# Patient Record
Sex: FEMALE | Race: WHITE | NOT HISPANIC OR LATINO | Employment: OTHER | URBAN - METROPOLITAN AREA
[De-identification: names, ages, dates, MRNs, and addresses within clinical notes are randomized per-mention and may not be internally consistent; named-entity substitution may affect disease eponyms.]

---

## 2017-07-03 ENCOUNTER — ALLSCRIPTS OFFICE VISIT (OUTPATIENT)
Dept: OTHER | Facility: OTHER | Age: 72
End: 2017-07-03

## 2017-08-14 ENCOUNTER — ALLSCRIPTS OFFICE VISIT (OUTPATIENT)
Dept: OTHER | Facility: OTHER | Age: 72
End: 2017-08-14

## 2017-09-26 ENCOUNTER — APPOINTMENT (OUTPATIENT)
Dept: RADIOLOGY | Facility: CLINIC | Age: 72
End: 2017-09-26
Payer: MEDICARE

## 2017-09-26 ENCOUNTER — ALLSCRIPTS OFFICE VISIT (OUTPATIENT)
Dept: OTHER | Facility: OTHER | Age: 72
End: 2017-09-26

## 2017-09-26 DIAGNOSIS — M25.562 PAIN IN LEFT KNEE: ICD-10-CM

## 2017-09-26 DIAGNOSIS — M17.12 PRIMARY OSTEOARTHRITIS OF LEFT KNEE: ICD-10-CM

## 2017-09-26 PROCEDURE — 73562 X-RAY EXAM OF KNEE 3: CPT

## 2017-10-05 ENCOUNTER — APPOINTMENT (OUTPATIENT)
Dept: PHYSICAL THERAPY | Facility: CLINIC | Age: 72
End: 2017-10-05
Payer: MEDICARE

## 2017-10-05 PROCEDURE — G8991 OTHER PT/OT GOAL STATUS: HCPCS

## 2017-10-05 PROCEDURE — 97110 THERAPEUTIC EXERCISES: CPT

## 2017-10-05 PROCEDURE — G8990 OTHER PT/OT CURRENT STATUS: HCPCS

## 2017-10-05 PROCEDURE — 97162 PT EVAL MOD COMPLEX 30 MIN: CPT

## 2017-10-06 ENCOUNTER — GENERIC CONVERSION - ENCOUNTER (OUTPATIENT)
Dept: OTHER | Facility: OTHER | Age: 72
End: 2017-10-06

## 2017-10-10 ENCOUNTER — APPOINTMENT (OUTPATIENT)
Dept: PHYSICAL THERAPY | Facility: CLINIC | Age: 72
End: 2017-10-10
Payer: MEDICARE

## 2017-10-10 PROCEDURE — 97140 MANUAL THERAPY 1/> REGIONS: CPT

## 2017-10-10 PROCEDURE — 97110 THERAPEUTIC EXERCISES: CPT

## 2017-10-12 ENCOUNTER — APPOINTMENT (OUTPATIENT)
Dept: PHYSICAL THERAPY | Facility: CLINIC | Age: 72
End: 2017-10-12
Payer: MEDICARE

## 2017-10-12 PROCEDURE — 97110 THERAPEUTIC EXERCISES: CPT

## 2017-10-17 ENCOUNTER — APPOINTMENT (OUTPATIENT)
Dept: PHYSICAL THERAPY | Facility: CLINIC | Age: 72
End: 2017-10-17
Payer: MEDICARE

## 2017-10-17 PROCEDURE — 97150 GROUP THERAPEUTIC PROCEDURES: CPT

## 2017-10-17 PROCEDURE — 97110 THERAPEUTIC EXERCISES: CPT

## 2017-10-19 ENCOUNTER — APPOINTMENT (OUTPATIENT)
Dept: PHYSICAL THERAPY | Facility: CLINIC | Age: 72
End: 2017-10-19
Payer: MEDICARE

## 2017-10-20 ENCOUNTER — APPOINTMENT (OUTPATIENT)
Dept: PHYSICAL THERAPY | Facility: CLINIC | Age: 72
End: 2017-10-20
Payer: MEDICARE

## 2017-10-20 PROCEDURE — 97140 MANUAL THERAPY 1/> REGIONS: CPT

## 2017-10-20 PROCEDURE — 97110 THERAPEUTIC EXERCISES: CPT

## 2017-10-24 ENCOUNTER — APPOINTMENT (OUTPATIENT)
Dept: PHYSICAL THERAPY | Facility: CLINIC | Age: 72
End: 2017-10-24
Payer: MEDICARE

## 2017-10-24 PROCEDURE — 97110 THERAPEUTIC EXERCISES: CPT

## 2017-10-24 PROCEDURE — 97140 MANUAL THERAPY 1/> REGIONS: CPT

## 2017-10-26 ENCOUNTER — APPOINTMENT (OUTPATIENT)
Dept: PHYSICAL THERAPY | Facility: CLINIC | Age: 72
End: 2017-10-26
Payer: MEDICARE

## 2017-10-26 PROCEDURE — 97140 MANUAL THERAPY 1/> REGIONS: CPT

## 2017-10-26 PROCEDURE — 97150 GROUP THERAPEUTIC PROCEDURES: CPT

## 2017-10-26 PROCEDURE — 97110 THERAPEUTIC EXERCISES: CPT

## 2018-01-09 ENCOUNTER — GENERIC CONVERSION - ENCOUNTER (OUTPATIENT)
Dept: INTERNAL MEDICINE CLINIC | Facility: CLINIC | Age: 73
End: 2018-01-09

## 2018-01-13 VITALS
DIASTOLIC BLOOD PRESSURE: 73 MMHG | WEIGHT: 185.13 LBS | SYSTOLIC BLOOD PRESSURE: 132 MMHG | BODY MASS INDEX: 36.34 KG/M2 | HEART RATE: 70 BPM | HEIGHT: 60 IN

## 2018-01-15 VITALS
WEIGHT: 190.25 LBS | HEIGHT: 60 IN | DIASTOLIC BLOOD PRESSURE: 79 MMHG | HEART RATE: 71 BPM | BODY MASS INDEX: 37.35 KG/M2 | SYSTOLIC BLOOD PRESSURE: 129 MMHG

## 2018-01-15 VITALS
WEIGHT: 189.5 LBS | HEIGHT: 60 IN | DIASTOLIC BLOOD PRESSURE: 81 MMHG | BODY MASS INDEX: 37.21 KG/M2 | SYSTOLIC BLOOD PRESSURE: 137 MMHG | HEART RATE: 72 BPM

## 2019-07-09 ENCOUNTER — EVALUATION (OUTPATIENT)
Dept: PHYSICAL THERAPY | Facility: CLINIC | Age: 74
End: 2019-07-09
Payer: MEDICARE

## 2019-07-09 DIAGNOSIS — S93.402D SPRAIN OF LIGAMENT OF LEFT ANKLE, SUBSEQUENT ENCOUNTER: Primary | ICD-10-CM

## 2019-07-09 PROCEDURE — G8979 MOBILITY GOAL STATUS: HCPCS | Performed by: PHYSICAL THERAPIST

## 2019-07-09 PROCEDURE — 97161 PT EVAL LOW COMPLEX 20 MIN: CPT | Performed by: PHYSICAL THERAPIST

## 2019-07-09 PROCEDURE — 97110 THERAPEUTIC EXERCISES: CPT | Performed by: PHYSICAL THERAPIST

## 2019-07-09 PROCEDURE — G8978 MOBILITY CURRENT STATUS: HCPCS | Performed by: PHYSICAL THERAPIST

## 2019-07-09 RX ORDER — MELOXICAM 7.5 MG/1
7.5 TABLET ORAL AS NEEDED
COMMUNITY
End: 2021-05-06 | Stop reason: HOSPADM

## 2019-07-09 NOTE — PROGRESS NOTES
PT Evaluation     Today's date: 2019  Patient name: Kandace Jesus  :   MRN: [id number]  Referring provider: Haris Sr MD  Dx:   Encounter Diagnosis     ICD-10-CM    1  Sprain of ligament of left ankle, subsequent encounter S93 402D                   Assessment  Assessment details: Kandace Jesus is a 68 y o  Female who presents with signs and symptoms of referring diagnosis of left ankle sprain  Patient has decreased ankle ROM, deacreased LE strength, decrased flexibility, abnormal gait, and decreased function  Patient would benefit from skilled physical therapy in order to address said deficits and improve functional mobility  Impairments: abnormal gait, abnormal or restricted ROM, abnormal movement, activity intolerance, impaired balance, impaired physical strength, lacks appropriate home exercise program and pain with function  Understanding of Dx/Px/POC: good   Prognosis: good    Goals  STG;  LTG:     Plan  Patient would benefit from: skilled physical therapy  Planned modality interventions: cryotherapy  Planned therapy interventions: joint mobilization, manual therapy, neuromuscular re-education, patient education, strengthening, stretching, therapeutic activities, therapeutic exercise, flexibility, gait training and functional ROM exercises  Frequency: 2x week  Duration in weeks: 6  Treatment plan discussed with: patient        Subjective Evaluation    History of Present Illness  Date of onset: 2019  Mechanism of injury: Patient reports slipping on pool steps and scraped up her knee and twisted her ankle  She had gone to a walk in clinic and had Xrays and had - fractures  She notes instability, swelling, and pain  She reports she has full mobility and function  She uses a SPC on occasion  She is driving, she is doing stairs step-to  Previous c/s surgeries in ' with difficulty feeling sensations and hot/cold in LEs    Pain  Current pain ratin  At worst pain rating: 6      Diagnostic Tests  X-ray: normal  Patient Goals  Patient goal:  get rid of brace, and wear her regular shoes         Objective     Tenderness   Left Ankle/Foot   Tenderness in the anterior talofibular ligament, calcaneofibular ligament and posterior talofibular ligament       Active Range of Motion   Left Ankle/Foot   Dorsiflexion (kf): 0 degrees   Plantar flexion: 50 degrees   Inversion: 18 degrees   Eversion: 22 degrees     Right Ankle/Foot   Dorsiflexion (kf): -4 degrees   Plantar flexion: 44 degrees   Inversion: 26 degrees   Eversion: 30 degrees     Strength/Myotome Testing     Left Ankle/Foot   Dorsiflexion: 4+  Plantar flexion: 4-  Inversion: 4+  Eversion: 4+      Flowsheet Rows      Most Recent Value   PT/OT G-Codes   Current Score  49   Projected Score  68   Assessment Type  Evaluation   G code set  Mobility: Walking & Moving Around   Mobility: Walking and Moving Around Current Status ()  CK   Mobility: Walking and Moving Around Goal Status ()  CJ             Precautions: None     Manual                                                                                   Exercise Diary  7/9            Calf stretch towel 20" x4 SB           Ankle alphabet x1            Ankle pumps x20            Ankle Tb 4 way NV            Seated heel raises  NV            BAPS cw/ccw, front/back, med/latera NV            SLS NV            Towel curls  NV            Wobble board fwd/back, side/side NV                                                                                                                                                               Modalities              CP PRN

## 2019-07-09 NOTE — LETTER
2019    MD Nicole Pineda 5  [address]    Patient: Miranda Fuentes   YOB: 1945   Date of Visit: 2019     Encounter Diagnosis     ICD-10-CM    1  Sprain of ligament of left ankle, subsequent encounter S93 402D        Dear Dr Oconnor Speaks:    Please review the attached Plan of Care from Gaviota Easton Velez's recent visit  Please verify that you agree therapy should continue by signing the attached document and sending it back to our office  If you have any questions or concerns, please don't hesitate to call  Sincerely,    Ashley Ibrahim, PT      Referring Provider:      I certify that I have read the below Plan of Care and certify the need for these services furnished under this plan of treatment while under my care  MD Nicole Pineda 5  [address] [place] [address]: [phone]          PT Evaluation     Today's date: 2019  Patient name: Miranda Fuentes  : 1945  MRN: [id number]  Referring provider: Gary Mora MD  Dx:   Encounter Diagnosis     ICD-10-CM    1  Sprain of ligament of left ankle, subsequent encounter S93 402D                   Assessment  Assessment details: Miranda Fuentes is a 68 y o  Female who presents with signs and symptoms of referring diagnosis of left ankle sprain  Patient has decreased ankle ROM, deacreased LE strength, decrased flexibility, abnormal gait, and decreased function  Patient would benefit from skilled physical therapy in order to address said deficits and improve functional mobility     Impairments: abnormal gait, abnormal or restricted ROM, abnormal movement, activity intolerance, impaired balance, impaired physical strength, lacks appropriate home exercise program and pain with function  Understanding of Dx/Px/POC: good   Prognosis: good    Goals  STG;  LTG:     Plan  Patient would benefit from: skilled physical therapy  Planned modality interventions: cryotherapy  Planned therapy interventions: joint mobilization, manual therapy, neuromuscular re-education, patient education, strengthening, stretching, therapeutic activities, therapeutic exercise, flexibility, gait training and functional ROM exercises  Frequency: 2x week  Duration in weeks: 6  Treatment plan discussed with: patient        Subjective Evaluation    History of Present Illness  Date of onset: 2019  Mechanism of injury: Patient reports slipping on pool steps and scraped up her knee and twisted her ankle  She had gone to a walk in clinic and had Xrays and had - fractures  She notes instability, swelling, and pain  She reports she has full mobility and function  She uses a SPC on occasion  She is driving, she is doing stairs step-to  Previous c/s surgeries in 80' with difficulty feeling sensations and hot/cold in LEs  Pain  Current pain ratin  At worst pain ratin      Diagnostic Tests  X-ray: normal  Patient Goals  Patient goal:  get rid of brace, and wear her regular shoes         Objective     Tenderness   Left Ankle/Foot   Tenderness in the anterior talofibular ligament, calcaneofibular ligament and posterior talofibular ligament       Active Range of Motion   Left Ankle/Foot   Dorsiflexion (kf): 0 degrees   Plantar flexion: 50 degrees   Inversion: 18 degrees   Eversion: 22 degrees     Right Ankle/Foot   Dorsiflexion (kf): -4 degrees   Plantar flexion: 44 degrees   Inversion: 26 degrees   Eversion: 30 degrees     Strength/Myotome Testing     Left Ankle/Foot   Dorsiflexion: 4+  Plantar flexion: 4-  Inversion: 4+  Eversion: 4+      Flowsheet Rows      Most Recent Value   PT/OT G-Codes   Current Score  49   Projected Score  68   Assessment Type  Evaluation   G code set  Mobility: Walking & Moving Around   Mobility: Walking and Moving Around Current Status ()  CK   Mobility: Walking and Moving Around Goal Status ()  CJ             Precautions: None     Manual Exercise Diary  7/9            Calf stretch towel 20" x4 SB           Ankle alphabet x1            Ankle pumps x20            Ankle Tb 4 way NV            Seated heel raises  NV            BAPS cw/ccw, front/back, med/latera NV            SLS NV            Towel curls  NV            Wobble board fwd/back, side/side NV                                                                                                                                                               Modalities              CP PRN

## 2019-07-11 ENCOUNTER — TRANSCRIBE ORDERS (OUTPATIENT)
Dept: PHYSICAL THERAPY | Facility: CLINIC | Age: 74
End: 2019-07-11

## 2019-07-11 DIAGNOSIS — S93.402D SPRAIN OF LIGAMENT OF LEFT ANKLE, SUBSEQUENT ENCOUNTER: Primary | ICD-10-CM

## 2019-07-12 ENCOUNTER — OFFICE VISIT (OUTPATIENT)
Dept: PHYSICAL THERAPY | Facility: CLINIC | Age: 74
End: 2019-07-12
Payer: MEDICARE

## 2019-07-12 DIAGNOSIS — S93.402D SPRAIN OF LIGAMENT OF LEFT ANKLE, SUBSEQUENT ENCOUNTER: Primary | ICD-10-CM

## 2019-07-12 PROCEDURE — 97140 MANUAL THERAPY 1/> REGIONS: CPT | Performed by: PHYSICAL THERAPIST

## 2019-07-12 PROCEDURE — 97110 THERAPEUTIC EXERCISES: CPT | Performed by: PHYSICAL THERAPIST

## 2019-07-12 PROCEDURE — 97530 THERAPEUTIC ACTIVITIES: CPT | Performed by: PHYSICAL THERAPIST

## 2019-07-12 NOTE — PROGRESS NOTES
Daily Note     Today's date: 2019  Patient name: Lexie Garcia  :   MRN: [id number]  Referring provider: Dyllan Mcdonald MD  Dx:   Encounter Diagnosis     ICD-10-CM    1  Sprain of ligament of left ankle, subsequent encounter S93 402D                   Subjective: Patient reports she feels pretty good  She has not bee wearing brace at night or around the house  She has minimal pain  Objective: See treatment diary below      Assessment: Tolerated treatment well  Patient had slight discomfort with stair training, especially with descending stair though form improved as patient progressed  Patient had no pain and difficulty with added TB exercises  Significant calf tightness and soreness noted with stretch at SB today  Patient has difficulty with static balance on ground today and demonstrates  good carryover, mild cues needed for maintaining form and not rolling entire LE  Progress as tolerated  Plan: Progress treatment as tolerated         Precautions: None     Manual              Ankle  DF PROM 8'                                                                    Exercise Diary             Calf stretch towel 20" x4 SB  15" x4           Ankle alphabet x1 HEP           Ankle pumps x20 HEP            Ankle Tb 4 way NV G TB 2x10 each            Seated heel/toe raises  NV 20x           BAPS cw/ccw, front/back, med/latera NV 20x each           SLS NV On ground 10" x3           Towel curls  NV 1'            Wobble board fwd/back, side/side NV NV           Step up and overs  4" x10   6" x10           Hurdles forward   NV           Hurdles lateral  NV                                                                                                                       Modalities              CP PRN

## 2019-07-16 ENCOUNTER — OFFICE VISIT (OUTPATIENT)
Dept: PHYSICAL THERAPY | Facility: CLINIC | Age: 74
End: 2019-07-16
Payer: MEDICARE

## 2019-07-16 DIAGNOSIS — S93.402D SPRAIN OF LIGAMENT OF LEFT ANKLE, SUBSEQUENT ENCOUNTER: Primary | ICD-10-CM

## 2019-07-16 PROCEDURE — 97110 THERAPEUTIC EXERCISES: CPT

## 2019-07-16 PROCEDURE — 97530 THERAPEUTIC ACTIVITIES: CPT

## 2019-07-16 PROCEDURE — 97140 MANUAL THERAPY 1/> REGIONS: CPT

## 2019-07-16 NOTE — PROGRESS NOTES
Daily Note     Today's date: 2019  Patient name: Laura Hawkins  :   MRN: [id number]  Referring provider: Corrinne Parson, MD  Dx:   Encounter Diagnosis     ICD-10-CM    1  Sprain of ligament of left ankle, subsequent encounter S93 402D                   Subjective:  Pt states her ankle has been feeling pretty good  Pt states she feels her ankle is getting close to being fully healed  Objective: See treatment diary below      Assessment: Pt progressed through exercises well with no increase in pain  Pt tolerated new exercises well with good form after verbal cuing  Pt demonstrates good passive ROM of her left ankle with minimal pain at end range  Pt would continue to benefit from PT  Plan: Progress treatment as tolerated         Precautions: None     Manual             Ankle  DF PROM 8' 8'                                                                   Exercise Diary            Calf stretch towel 20" x4 SB  15" x4 SB 15" x 4           Ankle alphabet x1 HEP --          Ankle pumps x20 HEP  --          Ankle Tb 4 way NV G TB 2x10 each  GTB 2 x 10           Seated heel/toe raises  NV 20x 20x           BAPS cw/ccw, front/back, med/latera NV 20x each 20x ea          SLS NV On ground 10" x3 10' x 5           Towel curls  NV 1'  1'          Wobble board fwd/back, side/side NV NV 20x ea          Step up and overs  4" x10   6" x10 6" x 15           Hurdles forward   NV 2 laps           Hurdles lateral  NV 2 laps                                                                                                                      Modalities              CP PRN

## 2019-07-19 ENCOUNTER — OFFICE VISIT (OUTPATIENT)
Dept: PHYSICAL THERAPY | Facility: CLINIC | Age: 74
End: 2019-07-19
Payer: MEDICARE

## 2019-07-19 DIAGNOSIS — S93.402D SPRAIN OF LIGAMENT OF LEFT ANKLE, SUBSEQUENT ENCOUNTER: Primary | ICD-10-CM

## 2019-07-19 PROCEDURE — 97110 THERAPEUTIC EXERCISES: CPT

## 2019-07-19 PROCEDURE — 97140 MANUAL THERAPY 1/> REGIONS: CPT

## 2019-07-19 PROCEDURE — 97530 THERAPEUTIC ACTIVITIES: CPT

## 2019-07-23 ENCOUNTER — APPOINTMENT (OUTPATIENT)
Dept: PHYSICAL THERAPY | Facility: CLINIC | Age: 74
End: 2019-07-23
Payer: MEDICARE

## 2019-07-26 ENCOUNTER — APPOINTMENT (OUTPATIENT)
Dept: PHYSICAL THERAPY | Facility: CLINIC | Age: 74
End: 2019-07-26
Payer: MEDICARE

## 2019-07-30 ENCOUNTER — APPOINTMENT (OUTPATIENT)
Dept: PHYSICAL THERAPY | Facility: CLINIC | Age: 74
End: 2019-07-30
Payer: MEDICARE

## 2020-11-20 RX ORDER — LEVOTHYROXINE SODIUM 125 UG/1
CAPSULE ORAL EVERY MORNING
COMMUNITY
Start: 2020-08-18

## 2020-11-20 RX ORDER — FLUTICASONE FUROATE AND VILANTEROL TRIFENATATE 100; 25 UG/1; UG/1
POWDER RESPIRATORY (INHALATION) EVERY MORNING
COMMUNITY
Start: 2020-09-04

## 2020-11-20 RX ORDER — NAPROXEN SODIUM 220 MG
220 TABLET ORAL EVERY 12 HOURS PRN
COMMUNITY
End: 2021-05-06 | Stop reason: HOSPADM

## 2020-11-20 RX ORDER — LOSARTAN POTASSIUM 25 MG/1
TABLET ORAL
COMMUNITY
Start: 2020-08-18 | End: 2021-05-06 | Stop reason: HOSPADM

## 2020-11-20 RX ORDER — PREDNISOLONE ACETATE 10 MG/ML
SUSPENSION/ DROPS OPHTHALMIC 4 TIMES DAILY
COMMUNITY
Start: 2020-11-18

## 2020-11-20 RX ORDER — PRAVASTATIN SODIUM 20 MG
TABLET ORAL
Status: ON HOLD | COMMUNITY
Start: 2020-08-18 | End: 2021-05-05

## 2020-11-20 RX ORDER — GATIFLOXACIN 5 MG/ML
SOLUTION/ DROPS OPHTHALMIC
Status: ON HOLD | COMMUNITY
Start: 2020-11-18 | End: 2021-01-18 | Stop reason: SDUPTHER

## 2020-11-20 RX ORDER — CHOLECALCIFEROL (VITAMIN D3) 50 MCG
TABLET ORAL 2 TIMES DAILY
COMMUNITY

## 2020-11-20 RX ORDER — CETIRIZINE HYDROCHLORIDE 10 MG/1
10 TABLET ORAL EVERY MORNING
COMMUNITY

## 2020-11-20 RX ORDER — KETOROLAC TROMETHAMINE 5 MG/ML
SOLUTION OPHTHALMIC
Status: ON HOLD | COMMUNITY
Start: 2020-11-18 | End: 2021-01-18 | Stop reason: SDUPTHER

## 2020-11-20 RX ORDER — IBUPROFEN 200 MG
CAPSULE ORAL
COMMUNITY

## 2020-11-24 DIAGNOSIS — Z20.822 COVID-19 RULED OUT BY LABORATORY TESTING: ICD-10-CM

## 2020-11-24 PROCEDURE — U0003 INFECTIOUS AGENT DETECTION BY NUCLEIC ACID (DNA OR RNA); SEVERE ACUTE RESPIRATORY SYNDROME CORONAVIRUS 2 (SARS-COV-2) (CORONAVIRUS DISEASE [COVID-19]), AMPLIFIED PROBE TECHNIQUE, MAKING USE OF HIGH THROUGHPUT TECHNOLOGIES AS DESCRIBED BY CMS-2020-01-R: HCPCS | Performed by: OPHTHALMOLOGY

## 2020-11-25 LAB — SARS-COV-2 RNA SPEC QL NAA+PROBE: NOT DETECTED

## 2020-11-30 ENCOUNTER — HOSPITAL ENCOUNTER (OUTPATIENT)
Facility: AMBULARY SURGERY CENTER | Age: 75
Setting detail: OUTPATIENT SURGERY
Discharge: HOME | End: 2020-11-30
Attending: OPHTHALMOLOGY | Admitting: OPHTHALMOLOGY
Payer: MEDICARE

## 2020-11-30 ENCOUNTER — ANESTHESIA (OUTPATIENT)
Dept: PERIOP | Facility: AMBULARY SURGERY CENTER | Age: 75
End: 2020-11-30
Payer: MEDICARE

## 2020-11-30 ENCOUNTER — ANESTHESIA EVENT (OUTPATIENT)
Dept: PERIOP | Facility: AMBULARY SURGERY CENTER | Age: 75
End: 2020-11-30
Payer: MEDICARE

## 2020-11-30 VITALS
HEART RATE: 73 BPM | BODY MASS INDEX: 36.32 KG/M2 | OXYGEN SATURATION: 93 % | RESPIRATION RATE: 16 BRPM | HEIGHT: 60 IN | SYSTOLIC BLOOD PRESSURE: 132 MMHG | TEMPERATURE: 98 F | DIASTOLIC BLOOD PRESSURE: 68 MMHG | WEIGHT: 185 LBS

## 2020-11-30 VITALS — HEART RATE: 73 BPM

## 2020-11-30 DIAGNOSIS — Z20.822 COVID-19 RULED OUT BY LABORATORY TESTING: Primary | ICD-10-CM

## 2020-11-30 PROBLEM — G47.30 SLEEP APNEA: Status: ACTIVE | Noted: 2020-11-30

## 2020-11-30 PROBLEM — E03.9 HYPOTHYROIDISM: Status: ACTIVE | Noted: 2020-11-30

## 2020-11-30 PROBLEM — I10 HTN (HYPERTENSION): Status: ACTIVE | Noted: 2020-11-30

## 2020-11-30 PROCEDURE — V2632 POST CHMBR INTRAOCULAR LENS: HCPCS | Performed by: OPHTHALMOLOGY

## 2020-11-30 DEVICE — ACRYSOF(R) IQ ASPHERIC NATURAL IOL, SINGLE-PIECE ACRYLIC FOLDABLE PCL, UV WITH BLUE LIGHTFILTER, 13.0MM LENGTH, 6.0MM ANTERIORASYMMETRIC BICONVEX OPTIC, PLANAR HAPTICS.
Type: IMPLANTABLE DEVICE | Site: EYE | Status: FUNCTIONAL
Brand: ACRYSOF®

## 2020-11-30 RX ORDER — BALANCED SALT SOLUTION 6.4; .75; .48; .3; 3.9; 1.7 MG/ML; MG/ML; MG/ML; MG/ML; MG/ML; MG/ML
SOLUTION OPHTHALMIC AS NEEDED
Status: DISCONTINUED | OUTPATIENT
Start: 2020-11-30 | End: 2020-11-30 | Stop reason: HOSPADM

## 2020-11-30 RX ORDER — LIDOCAINE HYDROCHLORIDE 20 MG/ML
1 JELLY TOPICAL
Status: COMPLETED | OUTPATIENT
Start: 2020-11-30 | End: 2020-11-30

## 2020-11-30 RX ORDER — KETOROLAC TROMETHAMINE 5 MG/ML
1 SOLUTION OPHTHALMIC
Status: ACTIVE | OUTPATIENT
Start: 2020-11-30 | End: 2020-11-30

## 2020-11-30 RX ORDER — GATIFLOXACIN 5 MG/ML
SOLUTION/ DROPS OPHTHALMIC AS NEEDED
Status: DISCONTINUED | OUTPATIENT
Start: 2020-11-30 | End: 2020-11-30 | Stop reason: HOSPADM

## 2020-11-30 RX ORDER — PHENYLEPHRINE HCL 2.5 %
1 DROPS OPHTHALMIC (EYE)
Status: ACTIVE | OUTPATIENT
Start: 2020-11-30 | End: 2020-11-30

## 2020-11-30 RX ORDER — TETRACAINE HYDROCHLORIDE 5 MG/ML
1 SOLUTION OPHTHALMIC ONCE
Status: COMPLETED | OUTPATIENT
Start: 2020-11-30 | End: 2020-11-30

## 2020-11-30 RX ORDER — TETRACAINE HYDROCHLORIDE 5 MG/ML
SOLUTION OPHTHALMIC AS NEEDED
Status: DISCONTINUED | OUTPATIENT
Start: 2020-11-30 | End: 2020-11-30 | Stop reason: HOSPADM

## 2020-11-30 RX ORDER — CYCLOPENTOLATE HYDROCHLORIDE 10 MG/ML
1 SOLUTION/ DROPS OPHTHALMIC
Status: ACTIVE | OUTPATIENT
Start: 2020-11-30 | End: 2020-11-30

## 2020-11-30 RX ORDER — MIDAZOLAM HYDROCHLORIDE 2 MG/2ML
INJECTION, SOLUTION INTRAMUSCULAR; INTRAVENOUS AS NEEDED
Status: DISCONTINUED | OUTPATIENT
Start: 2020-11-30 | End: 2020-11-30

## 2020-11-30 RX ADMIN — CYCLOPENTOLATE HYDROCHLORIDE 1 DROP: 10 SOLUTION/ DROPS OPHTHALMIC at 09:45

## 2020-11-30 RX ADMIN — PHENYLEPHRINE HYDROCHLORIDE 1 DROP: 25 SOLUTION/ DROPS OPHTHALMIC at 09:45

## 2020-11-30 RX ADMIN — LIDOCAINE HYDROCHLORIDE 1 APPLICATION: 20 JELLY TOPICAL at 09:30

## 2020-11-30 RX ADMIN — CYCLOPENTOLATE HYDROCHLORIDE 1 DROP: 10 SOLUTION/ DROPS OPHTHALMIC at 09:30

## 2020-11-30 RX ADMIN — TETRACAINE HYDROCHLORIDE 1 DROP: 5 SOLUTION OPHTHALMIC at 09:15

## 2020-11-30 RX ADMIN — LIDOCAINE HYDROCHLORIDE 1 APPLICATION: 20 JELLY TOPICAL at 09:45

## 2020-11-30 RX ADMIN — MIDAZOLAM 2 MG: 1 INJECTION INTRAMUSCULAR; INTRAVENOUS at 10:05

## 2020-11-30 RX ADMIN — PHENYLEPHRINE HYDROCHLORIDE 1 DROP: 25 SOLUTION/ DROPS OPHTHALMIC at 09:30

## 2020-11-30 RX ADMIN — CYCLOPENTOLATE HYDROCHLORIDE 1 DROP: 10 SOLUTION/ DROPS OPHTHALMIC at 09:15

## 2020-11-30 RX ADMIN — PHENYLEPHRINE HYDROCHLORIDE 1 DROP: 25 SOLUTION/ DROPS OPHTHALMIC at 09:15

## 2020-11-30 RX ADMIN — KETOROLAC TROMETHAMINE 1 DROP: 5 SOLUTION OPHTHALMIC at 09:30

## 2020-11-30 RX ADMIN — LIDOCAINE HYDROCHLORIDE 1 APPLICATION: 20 JELLY TOPICAL at 09:15

## 2020-11-30 RX ADMIN — KETOROLAC TROMETHAMINE 1 DROP: 5 SOLUTION OPHTHALMIC at 09:45

## 2020-11-30 RX ADMIN — KETOROLAC TROMETHAMINE 1 DROP: 5 SOLUTION OPHTHALMIC at 09:15

## 2021-01-06 DIAGNOSIS — Z20.822 COVID-19 RULED OUT BY LABORATORY TESTING: Primary | ICD-10-CM

## 2021-01-08 NOTE — PRE-PROCEDURE INSTRUCTIONS
My Surgical Experience    The following information was developed to assist you to prepare for your operation  What do I need to do before coming to the hospital?   Arrange for a responsible person to drive you to and from the hospital    Arrange care for your children at home  Children are not allowed in the recovery areas of the hospital   Plan to wear clothing that is easy to put on and take off  If you are having shoulder surgery, wear a shirt that buttons or zippers in the front  Bathing  o Shower the evening before and the morning of your surgery with an antibacterial soap  Please refer to the Pre Op Showering Instructions for Surgery Patients Sheet   o Remove nail polish and all body piercing jewelry  o Do not shave any body part for at least 24 hours before surgery-this includes face, arms, legs and upper body  Food  o Nothing to eat or drink after midnight the night before your surgery  This includes candy and chewing gum  o Exception: If your surgery is after 12:00pm (noon), you may have clear liquids such as 7-Up®, ginger ale, apple or cranberry juice, Jell-O®, water, or clear broth until 8:00 am  o Do not drink milk or juice with pulp on the morning before surgery  o Do not drink alcohol 24 hours before surgery  Medicine  o Follow instructions you received from your surgeon about which medicines you may take on the day of surgery  o If instructed to take medicine on the morning of surgery, take pills with just a small sip of water  Call your prescribing doctor for specific infroamtion on what to do if you take insulin    What should I bring to the hospital?    Bring:  Orest Pepper or a walker, if you have them, for foot or knee surgery   A list of the daily medicines, vitamins, minerals, herbals and nutritional supplements you take   Include the dosages of medicines and the time you take them each day   Glasses, dentures or hearing aids   Minimal clothing; you will be wearing hospital sleepwear   Photo ID; required to verify your identity   If you have a Living Will or Power of , bring a copy of the documents   If you have an ostomy, bring an extra pouch and any supplies you use    Do not bring   Medicines or inhalers   Money, valuables or jewelry    What other information should I know about the day of surgery?  Notify your surgeons if you develop a cold, sore throat, cough, fever, rash or any other illness   Report to the Ambulatory Surgical/Same Day Surgery Unit   You will be instructed to stop at Registration only if you have not been pre-registered   Inform your  fi they do not stay that they will be asked by the staff to leave a phone number where they can be reached   Be available to be reached before surgery  In the event the operating room schedule changes, you may be asked to come in earlier or later than expected    *It is important to tell your doctor and others involved in your health care if you are taking or have been taking any non-prescription drugs, vitamins, minerals, herbals or other nutritional supplements  Any of these may interact with some food or medicines and cause a reaction      Pre-Surgery Instructions:   Medication Instructions    Calcium 600 MG tablet Instructed patient per Anesthesia Guidelines   cetirizine (ZyrTEC) 10 mg tablet Instructed patient per Anesthesia Guidelines   Cholecalciferol (Vitamin D) 50 MCG (2000 UT) tablet Instructed patient per Anesthesia Guidelines   Denosumab (PROLIA SC) Instructed patient per Anesthesia Guidelines   fluticasone-vilanterol (Breo Ellipta) 100-25 mcg/inh inhaler Instructed patient per Anesthesia Guidelines   Ibuprofen-diphenhydrAMINE Cit (ADVIL PM PO) Instructed patient per Anesthesia Guidelines   levothyroxine 112 mcg tablet Instructed patient per Anesthesia Guidelines   losartan (COZAAR) 25 mg tablet Instructed patient per Anesthesia Guidelines      meloxicam (MOBIC) 7 5 mg tablet Instructed patient per Anesthesia Guidelines   Multiple Vitamins-Minerals (CENTRUM SILVER PO) Instructed patient per Anesthesia Guidelines   naproxen sodium (ALEVE) 220 MG tablet Instructed patient per Anesthesia Guidelines   pravastatin (PRAVACHOL) 20 mg tablet Instructed patient per Anesthesia Guidelines   Probiotic Product (PROBIOTIC DAILY PO) Instructed patient per Anesthesia Guidelines  To take synthroid and use inhaler a m  of surgery

## 2021-01-12 DIAGNOSIS — Z20.822 COVID-19 RULED OUT BY LABORATORY TESTING: ICD-10-CM

## 2021-01-12 PROCEDURE — U0003 INFECTIOUS AGENT DETECTION BY NUCLEIC ACID (DNA OR RNA); SEVERE ACUTE RESPIRATORY SYNDROME CORONAVIRUS 2 (SARS-COV-2) (CORONAVIRUS DISEASE [COVID-19]), AMPLIFIED PROBE TECHNIQUE, MAKING USE OF HIGH THROUGHPUT TECHNOLOGIES AS DESCRIBED BY CMS-2020-01-R: HCPCS | Performed by: OPHTHALMOLOGY

## 2021-01-12 PROCEDURE — U0005 INFEC AGEN DETEC AMPLI PROBE: HCPCS | Performed by: OPHTHALMOLOGY

## 2021-01-13 LAB — SARS-COV-2 RNA SPEC QL NAA+PROBE: NOT DETECTED

## 2021-01-18 ENCOUNTER — HOSPITAL ENCOUNTER (OUTPATIENT)
Facility: AMBULARY SURGERY CENTER | Age: 76
Setting detail: OUTPATIENT SURGERY
Discharge: HOME | End: 2021-01-18
Attending: OPHTHALMOLOGY | Admitting: OPHTHALMOLOGY
Payer: MEDICARE

## 2021-01-18 ENCOUNTER — ANESTHESIA EVENT (OUTPATIENT)
Dept: PERIOP | Facility: AMBULARY SURGERY CENTER | Age: 76
End: 2021-01-18
Payer: MEDICARE

## 2021-01-18 ENCOUNTER — ANESTHESIA (OUTPATIENT)
Dept: PERIOP | Facility: AMBULARY SURGERY CENTER | Age: 76
End: 2021-01-18
Payer: MEDICARE

## 2021-01-18 VITALS
HEIGHT: 60 IN | TEMPERATURE: 96.8 F | DIASTOLIC BLOOD PRESSURE: 66 MMHG | RESPIRATION RATE: 16 BRPM | HEART RATE: 76 BPM | WEIGHT: 185 LBS | OXYGEN SATURATION: 94 % | BODY MASS INDEX: 36.32 KG/M2 | SYSTOLIC BLOOD PRESSURE: 148 MMHG

## 2021-01-18 VITALS — HEART RATE: 72 BPM

## 2021-01-18 DIAGNOSIS — H25.11 AGE-RELATED NUCLEAR CATARACT OF RIGHT EYE: Primary | ICD-10-CM

## 2021-01-18 PROCEDURE — V2632 POST CHMBR INTRAOCULAR LENS: HCPCS | Performed by: OPHTHALMOLOGY

## 2021-01-18 DEVICE — ACRYSOF(R) IQ ASPHERIC NATURAL IOL, SINGLE-PIECE ACRYLIC FOLDABLE PCL, UV WITH BLUE LIGHTFILTER, 13.0MM LENGTH, 6.0MM ANTERIORASYMMETRIC BICONVEX OPTIC, PLANAR HAPTICS.
Type: IMPLANTABLE DEVICE | Site: EYE | Status: FUNCTIONAL
Brand: ACRYSOF®

## 2021-01-18 RX ORDER — TETRACAINE HYDROCHLORIDE 5 MG/ML
SOLUTION OPHTHALMIC AS NEEDED
Status: DISCONTINUED | OUTPATIENT
Start: 2021-01-18 | End: 2021-01-18 | Stop reason: HOSPADM

## 2021-01-18 RX ORDER — PHENYLEPHRINE HCL 2.5 %
1 DROPS OPHTHALMIC (EYE)
Status: COMPLETED | OUTPATIENT
Start: 2021-01-18 | End: 2021-01-18

## 2021-01-18 RX ORDER — GATIFLOXACIN 5 MG/ML
1 SOLUTION/ DROPS OPHTHALMIC 2 TIMES DAILY
Refills: 0 | Status: ON HOLD
Start: 2021-01-18 | End: 2021-05-05

## 2021-01-18 RX ORDER — ONDANSETRON 2 MG/ML
4 INJECTION INTRAMUSCULAR; INTRAVENOUS ONCE AS NEEDED
Status: DISCONTINUED | OUTPATIENT
Start: 2021-01-18 | End: 2021-01-18 | Stop reason: HOSPADM

## 2021-01-18 RX ORDER — BALANCED SALT SOLUTION 6.4; .75; .48; .3; 3.9; 1.7 MG/ML; MG/ML; MG/ML; MG/ML; MG/ML; MG/ML
SOLUTION OPHTHALMIC AS NEEDED
Status: DISCONTINUED | OUTPATIENT
Start: 2021-01-18 | End: 2021-01-18 | Stop reason: HOSPADM

## 2021-01-18 RX ORDER — LIDOCAINE HYDROCHLORIDE 20 MG/ML
1 JELLY TOPICAL
Status: COMPLETED | OUTPATIENT
Start: 2021-01-18 | End: 2021-01-18

## 2021-01-18 RX ORDER — KETOROLAC TROMETHAMINE 5 MG/ML
1 SOLUTION OPHTHALMIC 4 TIMES DAILY
Qty: 5 ML | Refills: 0 | Status: ON HOLD
Start: 2021-01-18 | End: 2021-05-05

## 2021-01-18 RX ORDER — LIDOCAINE HYDROCHLORIDE 10 MG/ML
INJECTION, SOLUTION EPIDURAL; INFILTRATION; INTRACAUDAL; PERINEURAL AS NEEDED
Status: DISCONTINUED | OUTPATIENT
Start: 2021-01-18 | End: 2021-01-18 | Stop reason: HOSPADM

## 2021-01-18 RX ORDER — TETRACAINE HYDROCHLORIDE 5 MG/ML
1 SOLUTION OPHTHALMIC ONCE
Status: COMPLETED | OUTPATIENT
Start: 2021-01-18 | End: 2021-01-18

## 2021-01-18 RX ORDER — MIDAZOLAM HYDROCHLORIDE 2 MG/2ML
INJECTION, SOLUTION INTRAMUSCULAR; INTRAVENOUS AS NEEDED
Status: DISCONTINUED | OUTPATIENT
Start: 2021-01-18 | End: 2021-01-18

## 2021-01-18 RX ORDER — CYCLOPENTOLATE HYDROCHLORIDE 10 MG/ML
1 SOLUTION/ DROPS OPHTHALMIC
Status: COMPLETED | OUTPATIENT
Start: 2021-01-18 | End: 2021-01-18

## 2021-01-18 RX ORDER — GATIFLOXACIN 5 MG/ML
SOLUTION/ DROPS OPHTHALMIC AS NEEDED
Status: DISCONTINUED | OUTPATIENT
Start: 2021-01-18 | End: 2021-01-18 | Stop reason: HOSPADM

## 2021-01-18 RX ORDER — KETOROLAC TROMETHAMINE 5 MG/ML
1 SOLUTION OPHTHALMIC
Status: COMPLETED | OUTPATIENT
Start: 2021-01-18 | End: 2021-01-18

## 2021-01-18 RX ADMIN — TETRACAINE HYDROCHLORIDE 1 DROP: 5 SOLUTION OPHTHALMIC at 06:20

## 2021-01-18 RX ADMIN — KETOROLAC TROMETHAMINE 1 DROP: 5 SOLUTION OPHTHALMIC at 06:20

## 2021-01-18 RX ADMIN — CYCLOPENTOLATE HYDROCHLORIDE 1 DROP: 10 SOLUTION/ DROPS OPHTHALMIC at 07:05

## 2021-01-18 RX ADMIN — PHENYLEPHRINE HYDROCHLORIDE 1 DROP: 25 SOLUTION/ DROPS OPHTHALMIC at 06:50

## 2021-01-18 RX ADMIN — CYCLOPENTOLATE HYDROCHLORIDE 1 DROP: 10 SOLUTION/ DROPS OPHTHALMIC at 06:50

## 2021-01-18 RX ADMIN — KETOROLAC TROMETHAMINE 1 DROP: 5 SOLUTION OPHTHALMIC at 06:50

## 2021-01-18 RX ADMIN — PHENYLEPHRINE HYDROCHLORIDE 1 DROP: 25 SOLUTION/ DROPS OPHTHALMIC at 06:20

## 2021-01-18 RX ADMIN — LIDOCAINE HYDROCHLORIDE 5 APPLICATION: 20 JELLY TOPICAL at 06:35

## 2021-01-18 RX ADMIN — PHENYLEPHRINE HYDROCHLORIDE 1 DROP: 25 SOLUTION/ DROPS OPHTHALMIC at 06:35

## 2021-01-18 RX ADMIN — KETOROLAC TROMETHAMINE 1 DROP: 5 SOLUTION OPHTHALMIC at 06:35

## 2021-01-18 RX ADMIN — LIDOCAINE HYDROCHLORIDE 5 APPLICATION: 20 JELLY TOPICAL at 06:50

## 2021-01-18 RX ADMIN — PHENYLEPHRINE HYDROCHLORIDE 1 DROP: 25 SOLUTION/ DROPS OPHTHALMIC at 07:05

## 2021-01-18 RX ADMIN — CYCLOPENTOLATE HYDROCHLORIDE 1 DROP: 10 SOLUTION/ DROPS OPHTHALMIC at 06:20

## 2021-01-18 RX ADMIN — MIDAZOLAM 2 MG: 1 INJECTION INTRAMUSCULAR; INTRAVENOUS at 07:05

## 2021-01-18 RX ADMIN — LIDOCAINE HYDROCHLORIDE 5 APPLICATION: 20 JELLY TOPICAL at 06:58

## 2021-01-18 RX ADMIN — KETOROLAC TROMETHAMINE 1 DROP: 5 SOLUTION OPHTHALMIC at 07:05

## 2021-01-18 RX ADMIN — CYCLOPENTOLATE HYDROCHLORIDE 1 DROP: 10 SOLUTION/ DROPS OPHTHALMIC at 06:35

## 2021-01-18 NOTE — ANESTHESIA PREPROCEDURE EVALUATION
Procedure:  EXTRACTION EXTRACAPSULAR CATARACT PHACO INTRAOCULAR LENS (IOL) (Right Eye)    Relevant Problems   ANESTHESIA (within normal limits)      CARDIO   (+) HTN (hypertension)      ENDO   (+) Hypothyroidism      PULMONARY   (+) Sleep apnea        Physical Exam    Airway    Mallampati score: II  TM Distance: >3 FB  Neck ROM: full     Dental   Comment: Upper partial denture,     Cardiovascular  Rhythm: regular, Rate: normal,     Pulmonary  Pulmonary exam normal     Other Findings        Anesthesia Plan  ASA Score- 2     Anesthesia Type- IV sedation with anesthesia with ASA Monitors  Additional Monitors:   Airway Plan:           Plan Factors-Exercise tolerance (METS): >4 METS  Chart reviewed  Existing labs reviewed  Patient summary reviewed  Patient is not a current smoker  Induction- intravenous  Postoperative Plan-     Informed Consent- Anesthetic plan and risks discussed with patient  I personally reviewed this patient with the CRNA  Discussed and agreed on the Anesthesia Plan with the CRNA  Batool Lock

## 2021-01-18 NOTE — DISCHARGE INSTRUCTIONS
Dr Cindy Blanton Cataract Instructions    Activity:     1  No Driving until instructed   2  Keep shield on until seen tomorrow except when administering drops   3  No heavy lifting   4  No water in eye     Diet:     1  Resume normal diet    Normal Symptoms:     1  Mild Headache   2  Scratchy or picky feeling around eye    Call the office if:     1  You have any questions or concerns   2  If eye pain is not relieved by extra strength tylenol    Office phone number:  [phone]      Next appointment:     1  See Dr Cindy Blanton at his office tomorrow as scheduled   ___________9:15 am_______________________________________________   2  Bring blue eye kit with you and eyedrops to the office    A new set of comprehensive instructions will be given and reviewed with you during your office visit tomorrow

## 2021-01-18 NOTE — OP NOTE
OPERATIVE REPORT    PATIENT NAME: Yung Norton    :  1945  MRN: [id number]  Pt Location: [facility] OR ROOM 01    Surgery Date: 2021    Surgeon(s) and Role:     * Nate Chun MD - Primary    Age-related nuclear cataract, right eye [H25 11]    Post-Op Diagnosis Codes:     * Age-related nuclear cataract, right eye [H25 11]    Procedure(s):  EXTRACTION EXTRACAPSULAR CATARACT PHACO INTRAOCULAR LENS (IOL)    Anesthesia Type:   IV Sedation with Anesthesia    Operative Indications:  Age-related nuclear cataract, right eye [H25 11]  Decreased vision to 20/40  With problems driving  Pt requested cataract sx the right eye    Procedure and Technique:    Procedure Details     The patient was brought in the OR in stable condition and placed on the operative table  The right eye was prepped and draped in the usual sterile manner  Attention was directed to the right eye where a lid speculum was placed  A 2 4 mm clear corneal incision was made temperally  1/2 cc of 1% MPF Lidocaine was irrigated into the anterior chamber followed by viscoat  The side port incision was placed superiorly  The pupil remained small and I did not feel it was safe to proceed  Therefore, a Beehler dilator was used to dilate the pupil further  The pupil stayed dilated with a small amount of micro hemorrhage at the margin  More viscoat was used and the pupil was dilated nicely and the procedure could be continued safely  The capsularrhexis was made and the nucleus was hydrodissected with BSS  The nucleus was then removed with the phaco handpiece followed by removal of the cortical material with the I/A handpiece  The capsular bag was then filled with Provisc  The IOL was folded and placed in to the capsular bag and centered well  The remaining Provisc was removed from the eye with the I/A  The wounds were hydrated with BSS and found to be water tight  The lid speculum was removed and 2 drops of Gatifloxicin were placed over the cornea  A protective eye shield was taped over the eye and the patient went to PACU in stable condition  I will see the patient in the office tomorrow and the expected post op period is a few weeks         Complications: None        Disposition: PACU   Condition: Stable    SIGNATURE: Ronda Ma MD  DATE: January 18, 2021  TIME: 7:34 AM

## 2021-02-13 DIAGNOSIS — Z23 ENCOUNTER FOR IMMUNIZATION: ICD-10-CM

## 2021-05-05 ENCOUNTER — APPOINTMENT (EMERGENCY)
Dept: RADIOLOGY | Facility: HOSPITAL | Age: 76
End: 2021-05-05
Payer: MEDICARE

## 2021-05-05 ENCOUNTER — HOSPITAL ENCOUNTER (OUTPATIENT)
Facility: HOSPITAL | Age: 76
Setting detail: OBSERVATION
LOS: 1 days | Discharge: HOME | End: 2021-05-06
Attending: EMERGENCY MEDICINE | Admitting: HOSPITALIST
Payer: MEDICARE

## 2021-05-05 ENCOUNTER — APPOINTMENT (OUTPATIENT)
Dept: NON INVASIVE DIAGNOSTICS | Facility: HOSPITAL | Age: 76
End: 2021-05-05
Payer: MEDICARE

## 2021-05-05 DIAGNOSIS — I48.0 PAROXYSMAL ATRIAL FIBRILLATION (HCC): ICD-10-CM

## 2021-05-05 DIAGNOSIS — A41.9 SEPSIS (HCC): ICD-10-CM

## 2021-05-05 DIAGNOSIS — N39.0 UTI (URINARY TRACT INFECTION): Primary | ICD-10-CM

## 2021-05-05 DIAGNOSIS — I47.1 SVT (SUPRAVENTRICULAR TACHYCARDIA) (HCC): ICD-10-CM

## 2021-05-05 DIAGNOSIS — R00.2 PALPITATIONS: ICD-10-CM

## 2021-05-05 PROBLEM — Z87.891 FORMER TOBACCO USE: Status: ACTIVE | Noted: 2021-05-05

## 2021-05-05 PROBLEM — R60.9 PITTING EDEMA: Status: ACTIVE | Noted: 2021-05-05

## 2021-05-05 PROBLEM — J43.8 OTHER EMPHYSEMA (HCC): Status: ACTIVE | Noted: 2021-05-05

## 2021-05-05 PROBLEM — J44.9 COPD (CHRONIC OBSTRUCTIVE PULMONARY DISEASE) (HCC): Status: ACTIVE | Noted: 2021-05-05

## 2021-05-05 PROBLEM — J43.8 OTHER EMPHYSEMA (HCC): Status: RESOLVED | Noted: 2021-05-05 | Resolved: 2021-05-05

## 2021-05-05 PROBLEM — I49.9 ARRHYTHMIA: Status: ACTIVE | Noted: 2021-05-05

## 2021-05-05 LAB
ALBUMIN SERPL BCP-MCNC: 3.4 G/DL (ref 3.5–5)
ALP SERPL-CCNC: 483 U/L (ref 46–116)
ALT SERPL W P-5'-P-CCNC: 168 U/L (ref 12–78)
ANION GAP SERPL CALCULATED.3IONS-SCNC: 5 MMOL/L (ref 4–13)
APTT PPP: 33 SECONDS (ref 23–37)
AST SERPL W P-5'-P-CCNC: 109 U/L (ref 5–45)
ATRIAL RATE: 267 BPM
ATRIAL RATE: 86 BPM
BACTERIA UR QL AUTO: ABNORMAL /HPF
BASOPHILS # BLD AUTO: 0.08 THOUSANDS/ΜL (ref 0–0.1)
BASOPHILS NFR BLD AUTO: 1 % (ref 0–1)
BILIRUB SERPL-MCNC: 0.72 MG/DL (ref 0.2–1)
BILIRUB UR QL STRIP: NEGATIVE
BUN SERPL-MCNC: 17 MG/DL (ref 5–25)
CALCIUM ALBUM COR SERPL-MCNC: 10.4 MG/DL (ref 8.3–10.1)
CALCIUM SERPL-MCNC: 9.9 MG/DL (ref 8.3–10.1)
CHLORIDE SERPL-SCNC: 110 MMOL/L (ref 100–108)
CLARITY UR: ABNORMAL
CO2 SERPL-SCNC: 24 MMOL/L (ref 21–32)
COLOR UR: YELLOW
CREAT SERPL-MCNC: 0.89 MG/DL (ref 0.6–1.3)
D DIMER PPP FEU-MCNC: 0.32 UG/ML FEU
EOSINOPHIL # BLD AUTO: 0.71 THOUSAND/ΜL (ref 0–0.61)
EOSINOPHIL NFR BLD AUTO: 8 % (ref 0–6)
ERYTHROCYTE [DISTWIDTH] IN BLOOD BY AUTOMATED COUNT: 13.6 % (ref 11.6–15.1)
GFR SERPL CREATININE-BSD FRML MDRD: 64 ML/MIN/1.73SQ M
GLUCOSE SERPL-MCNC: 115 MG/DL (ref 65–140)
GLUCOSE UR STRIP-MCNC: NEGATIVE MG/DL
HAV IGM SER QL: NORMAL
HBV CORE IGM SER QL: NORMAL
HBV SURFACE AG SER QL: NORMAL
HCT VFR BLD AUTO: 48.5 % (ref 34.8–46.1)
HCV AB SER QL: NORMAL
HGB BLD-MCNC: 16 G/DL (ref 11.5–15.4)
HGB UR QL STRIP.AUTO: ABNORMAL
HYALINE CASTS #/AREA URNS LPF: ABNORMAL /LPF
IMM GRANULOCYTES # BLD AUTO: 0.03 THOUSAND/UL (ref 0–0.2)
IMM GRANULOCYTES NFR BLD AUTO: 0 % (ref 0–2)
INR PPP: 0.99 (ref 0.84–1.19)
KETONES UR STRIP-MCNC: NEGATIVE MG/DL
LACTATE SERPL-SCNC: 2 MMOL/L (ref 0.5–2)
LEUKOCYTE ESTERASE UR QL STRIP: ABNORMAL
LYMPHOCYTES # BLD AUTO: 1.77 THOUSANDS/ΜL (ref 0.6–4.47)
LYMPHOCYTES NFR BLD AUTO: 19 % (ref 14–44)
MCH RBC QN AUTO: 33.8 PG (ref 26.8–34.3)
MCHC RBC AUTO-ENTMCNC: 33 G/DL (ref 31.4–37.4)
MCV RBC AUTO: 103 FL (ref 82–98)
MONOCYTES # BLD AUTO: 0.6 THOUSAND/ΜL (ref 0.17–1.22)
MONOCYTES NFR BLD AUTO: 7 % (ref 4–12)
NEUTROPHILS # BLD AUTO: 6.01 THOUSANDS/ΜL (ref 1.85–7.62)
NEUTS SEG NFR BLD AUTO: 65 % (ref 43–75)
NITRITE UR QL STRIP: POSITIVE
NON-SQ EPI CELLS URNS QL MICRO: ABNORMAL /HPF
NRBC BLD AUTO-RTO: 0 /100 WBCS
NT-PROBNP SERPL-MCNC: 710 PG/ML
P AXIS: 68 DEGREES
PH UR STRIP.AUTO: 6.5 [PH]
PLATELET # BLD AUTO: 354 THOUSANDS/UL (ref 149–390)
PMV BLD AUTO: 9.5 FL (ref 8.9–12.7)
POTASSIUM SERPL-SCNC: 4.4 MMOL/L (ref 3.5–5.3)
PR INTERVAL: 162 MS
PROT SERPL-MCNC: 8.3 G/DL (ref 6.4–8.2)
PROT UR STRIP-MCNC: NEGATIVE MG/DL
PROTHROMBIN TIME: 13.1 SECONDS (ref 11.6–14.5)
QRS AXIS: 78 DEGREES
QRS AXIS: 92 DEGREES
QRSD INTERVAL: 70 MS
QRSD INTERVAL: 74 MS
QT INTERVAL: 306 MS
QT INTERVAL: 342 MS
QTC INTERVAL: 409 MS
QTC INTERVAL: 470 MS
RBC # BLD AUTO: 4.73 MILLION/UL (ref 3.81–5.12)
RBC #/AREA URNS AUTO: ABNORMAL /HPF
SARS-COV-2 RNA RESP QL NAA+PROBE: NEGATIVE
SODIUM SERPL-SCNC: 139 MMOL/L (ref 136–145)
SP GR UR STRIP.AUTO: 1.01 (ref 1–1.03)
T WAVE AXIS: 31 DEGREES
T WAVE AXIS: 7 DEGREES
TROPONIN I SERPL-MCNC: <0.02 NG/ML
TSH SERPL DL<=0.05 MIU/L-ACNC: 2.58 UIU/ML (ref 0.36–3.74)
UROBILINOGEN UR QL STRIP.AUTO: 1 E.U./DL
VENTRICULAR RATE: 142 BPM
VENTRICULAR RATE: 86 BPM
WBC # BLD AUTO: 9.2 THOUSAND/UL (ref 4.31–10.16)
WBC #/AREA URNS AUTO: ABNORMAL /HPF

## 2021-05-05 PROCEDURE — 87186 SC STD MICRODIL/AGAR DIL: CPT | Performed by: EMERGENCY MEDICINE

## 2021-05-05 PROCEDURE — 84484 ASSAY OF TROPONIN QUANT: CPT | Performed by: EMERGENCY MEDICINE

## 2021-05-05 PROCEDURE — 93005 ELECTROCARDIOGRAM TRACING: CPT

## 2021-05-05 PROCEDURE — 85610 PROTHROMBIN TIME: CPT | Performed by: EMERGENCY MEDICINE

## 2021-05-05 PROCEDURE — 99285 EMERGENCY DEPT VISIT HI MDM: CPT | Performed by: EMERGENCY MEDICINE

## 2021-05-05 PROCEDURE — 80074 ACUTE HEPATITIS PANEL: CPT | Performed by: EMERGENCY MEDICINE

## 2021-05-05 PROCEDURE — 93010 ELECTROCARDIOGRAM REPORT: CPT | Performed by: INTERNAL MEDICINE

## 2021-05-05 PROCEDURE — 71045 X-RAY EXAM CHEST 1 VIEW: CPT

## 2021-05-05 PROCEDURE — 85730 THROMBOPLASTIN TIME PARTIAL: CPT | Performed by: EMERGENCY MEDICINE

## 2021-05-05 PROCEDURE — 96360 HYDRATION IV INFUSION INIT: CPT

## 2021-05-05 PROCEDURE — 96361 HYDRATE IV INFUSION ADD-ON: CPT

## 2021-05-05 PROCEDURE — 99205 OFFICE O/P NEW HI 60 MIN: CPT | Performed by: INTERNAL MEDICINE

## 2021-05-05 PROCEDURE — 85379 FIBRIN DEGRADATION QUANT: CPT | Performed by: EMERGENCY MEDICINE

## 2021-05-05 PROCEDURE — 93306 TTE W/DOPPLER COMPLETE: CPT

## 2021-05-05 PROCEDURE — 1123F ACP DISCUSS/DSCN MKR DOCD: CPT | Performed by: EMERGENCY MEDICINE

## 2021-05-05 PROCEDURE — 87040 BLOOD CULTURE FOR BACTERIA: CPT | Performed by: EMERGENCY MEDICINE

## 2021-05-05 PROCEDURE — 84443 ASSAY THYROID STIM HORMONE: CPT | Performed by: EMERGENCY MEDICINE

## 2021-05-05 PROCEDURE — 74177 CT ABD & PELVIS W/CONTRAST: CPT

## 2021-05-05 PROCEDURE — 87077 CULTURE AEROBIC IDENTIFY: CPT | Performed by: EMERGENCY MEDICINE

## 2021-05-05 PROCEDURE — U0003 INFECTIOUS AGENT DETECTION BY NUCLEIC ACID (DNA OR RNA); SEVERE ACUTE RESPIRATORY SYNDROME CORONAVIRUS 2 (SARS-COV-2) (CORONAVIRUS DISEASE [COVID-19]), AMPLIFIED PROBE TECHNIQUE, MAKING USE OF HIGH THROUGHPUT TECHNOLOGIES AS DESCRIBED BY CMS-2020-01-R: HCPCS | Performed by: HOSPITALIST

## 2021-05-05 PROCEDURE — 99285 EMERGENCY DEPT VISIT HI MDM: CPT

## 2021-05-05 PROCEDURE — 80053 COMPREHEN METABOLIC PANEL: CPT | Performed by: EMERGENCY MEDICINE

## 2021-05-05 PROCEDURE — 83605 ASSAY OF LACTIC ACID: CPT | Performed by: EMERGENCY MEDICINE

## 2021-05-05 PROCEDURE — 81001 URINALYSIS AUTO W/SCOPE: CPT | Performed by: EMERGENCY MEDICINE

## 2021-05-05 PROCEDURE — 87086 URINE CULTURE/COLONY COUNT: CPT | Performed by: EMERGENCY MEDICINE

## 2021-05-05 PROCEDURE — 93306 TTE W/DOPPLER COMPLETE: CPT | Performed by: INTERNAL MEDICINE

## 2021-05-05 PROCEDURE — 36415 COLL VENOUS BLD VENIPUNCTURE: CPT | Performed by: EMERGENCY MEDICINE

## 2021-05-05 PROCEDURE — 83880 ASSAY OF NATRIURETIC PEPTIDE: CPT | Performed by: PHYSICIAN ASSISTANT

## 2021-05-05 PROCEDURE — 85025 COMPLETE CBC W/AUTO DIFF WBC: CPT | Performed by: EMERGENCY MEDICINE

## 2021-05-05 PROCEDURE — 99220 PR INITIAL OBSERVATION CARE/DAY 70 MINUTES: CPT | Performed by: HOSPITALIST

## 2021-05-05 RX ORDER — LORATADINE 10 MG/1
10 TABLET ORAL DAILY
Status: CANCELLED | OUTPATIENT
Start: 2021-05-05

## 2021-05-05 RX ORDER — ATORVASTATIN CALCIUM 20 MG/1
20 TABLET, FILM COATED ORAL
Status: CANCELLED | OUTPATIENT
Start: 2021-05-05

## 2021-05-05 RX ORDER — ALBUTEROL SULFATE 90 UG/1
2 AEROSOL, METERED RESPIRATORY (INHALATION) EVERY 4 HOURS PRN
Status: DISCONTINUED | OUTPATIENT
Start: 2021-05-05 | End: 2021-05-06 | Stop reason: HOSPADM

## 2021-05-05 RX ORDER — FLECAINIDE ACETATE 100 MG/1
100 TABLET ORAL EVERY 12 HOURS SCHEDULED
Status: DISCONTINUED | OUTPATIENT
Start: 2021-05-06 | End: 2021-05-06 | Stop reason: HOSPADM

## 2021-05-05 RX ORDER — METOPROLOL SUCCINATE 25 MG/1
25 TABLET, EXTENDED RELEASE ORAL DAILY
Status: DISCONTINUED | OUTPATIENT
Start: 2021-05-05 | End: 2021-05-06 | Stop reason: HOSPADM

## 2021-05-05 RX ORDER — SODIUM CHLORIDE 9 MG/ML
75 INJECTION, SOLUTION INTRAVENOUS CONTINUOUS
Status: DISPENSED | OUTPATIENT
Start: 2021-05-05 | End: 2021-05-06

## 2021-05-05 RX ORDER — METOPROLOL SUCCINATE 25 MG/1
25 TABLET, EXTENDED RELEASE ORAL DAILY
Status: DISCONTINUED | OUTPATIENT
Start: 2021-05-05 | End: 2021-05-05

## 2021-05-05 RX ORDER — ACETAMINOPHEN 325 MG/1
650 TABLET ORAL ONCE
Status: COMPLETED | OUTPATIENT
Start: 2021-05-05 | End: 2021-05-05

## 2021-05-05 RX ORDER — IPRATROPIUM BROMIDE AND ALBUTEROL SULFATE 2.5; .5 MG/3ML; MG/3ML
3 SOLUTION RESPIRATORY (INHALATION) EVERY 4 HOURS PRN
Status: DISCONTINUED | OUTPATIENT
Start: 2021-05-05 | End: 2021-05-05

## 2021-05-05 RX ORDER — SACCHAROMYCES BOULARDII 250 MG
250 CAPSULE ORAL 2 TIMES DAILY
Status: DISCONTINUED | OUTPATIENT
Start: 2021-05-05 | End: 2021-05-06 | Stop reason: HOSPADM

## 2021-05-05 RX ORDER — LEVOTHYROXINE SODIUM 0.12 MG/1
125 TABLET ORAL
Status: CANCELLED | OUTPATIENT
Start: 2021-05-05

## 2021-05-05 RX ORDER — ASPIRIN 81 MG/1
81 TABLET ORAL DAILY
Status: DISCONTINUED | OUTPATIENT
Start: 2021-05-05 | End: 2021-05-05

## 2021-05-05 RX ORDER — SOTALOL HYDROCHLORIDE 80 MG/1
80 TABLET ORAL EVERY 12 HOURS SCHEDULED
Status: DISCONTINUED | OUTPATIENT
Start: 2021-05-05 | End: 2021-05-05

## 2021-05-05 RX ORDER — FLUTICASONE FUROATE AND VILANTEROL 100; 25 UG/1; UG/1
1 POWDER RESPIRATORY (INHALATION) DAILY
Status: CANCELLED | OUTPATIENT
Start: 2021-05-05

## 2021-05-05 RX ORDER — LOSARTAN POTASSIUM 25 MG/1
25 TABLET ORAL DAILY
Status: CANCELLED | OUTPATIENT
Start: 2021-05-05

## 2021-05-05 RX ADMIN — Medication 250 MG: at 18:00

## 2021-05-05 RX ADMIN — SODIUM CHLORIDE 1000 ML: 0.9 INJECTION, SOLUTION INTRAVENOUS at 09:33

## 2021-05-05 RX ADMIN — ACETAMINOPHEN 650 MG: 325 TABLET, FILM COATED ORAL at 22:46

## 2021-05-05 RX ADMIN — METOPROLOL SUCCINATE 25 MG: 25 TABLET, FILM COATED, EXTENDED RELEASE ORAL at 23:48

## 2021-05-05 RX ADMIN — SODIUM CHLORIDE 75 ML/HR: 0.9 INJECTION, SOLUTION INTRAVENOUS at 15:27

## 2021-05-05 RX ADMIN — CEFTRIAXONE 1000 MG: 1 INJECTION, POWDER, FOR SOLUTION INTRAMUSCULAR; INTRAVENOUS at 15:26

## 2021-05-05 RX ADMIN — IOHEXOL 100 ML: 350 INJECTION, SOLUTION INTRAVENOUS at 10:25

## 2021-05-05 RX ADMIN — APIXABAN 5 MG: 5 TABLET, FILM COATED ORAL at 18:00

## 2021-05-05 NOTE — ASSESSMENT & PLAN NOTE
On the right > left side  As per the daughter this is chronic  · Patient had last echocardiogram 3 years ago    Will repeat another

## 2021-05-05 NOTE — CONSULTS
Consultation - Electrophysiology - Cardiology  Bello Prakash 77 y o  female MRN: [id number]  Unit/Bed#: ED 08 Encounter: [id number]      Inpatient consult to Electrophysiology  Consult performed by: Ssuy Estrada PA-C  Consult ordered by: Maria Ines Trevino MD          History of Present Illness   Physician Requesting Consult: Maria Ines Trevino MD  Reason for Consult / Principal Problem: Atrial fibrillation/flutter    Assessment/Plan   Assessment:  1  New onset atrial fibrillation/flutter    - No anticoagulation prior to admision / [id number] score of 5   - prior EF of 65% per [facility] echo 2017    - rate control: none prior to admission   - antiarrhythmic therapy: none prior to admission   - prior cardioversion: none   - prior ablation: none  2  HTN   - maintained on Losartan 25 mg QD  3  Hypothyroidism  4  COPD  5  Sleep apnea on CPAP  6  Chronic pain disorder   - pain control with Aleve daily and Mobic PRN  7  H/o DVT      Plan:  Patient presented in narrow complex tachycardia and underwent spontaneous conversion without hemodynamically significant conversion pause  Initial onset of symptoms coincided with irregular tachycardic rhythm, per patient history  Awaiting official radiology read of echo performed today, but preliminary read shows preserved EF without significant valvular disease  After discussion with attending, it was felt that patient was likely in atrial fibrillation  Patient will undergo stress test tomorrow to rule out coronary artery disease prior to initiation of rhythm control with Flecainide  Will initiate rate control with Toprol XL 25 mg QD  Will continue to monitor blood pressures  Patient will be anticoagulated with Eliquis  Will recheck hepatic function panel tomorrow morning to trend LFTs  Currently euvolemic       HPI: Bello Prakash is a 77y o  year old female with PMH of HTN, hypothyroidism, COPD, sleep apnea (uses CPAP), h/o DVT, and chronic back pain who presents to [facility] ED with complaints of palpitations, lightheadedness, and diaphoresis since 10 pm 5/4  Patient reports she was sitting on her couch watching TV when she suddenly felt palpitations and lightheadedness  She noted her pulse was rapid and irregular  At 4 am this morning, she rechecked and noted her pulse was still rapid, HR 140s, but regular  Her home pulse ox read 90%  She was still lightheaded and was experiencing diaphoresis, so she came to the ED  Upon presentation, EKG demonstrated SVT,   She was slightly hypoxic, SpO2 89% requiring 2 L NC  Lab work revealed mild transaminitis and mild elevation in BNP  Subsequent hepatitis panel was negative  Of note, TSH, d-dimer, troponin, and INR were normal  UA did show evidence of UTI, which was treated with IV Rochepin  Patient converted spontaneously at 9:11 am and has remained NSR  Patient denies cardiac medical history  She had thallium stress test and echo at [facility] in 2017, which were normal with EF 65%  She admits to feeling easy fatigability for the past month, but otherwise denies cardiac symptoms prior to yesterdays episode, specifically denying palpitations, SOB, lightheadedness, presyncope, syncope, PND, orthopnea, LE edema, change in weight  Patient has a history of tobacco abuse, having quit in 1998  She admits to drinking 2 glasses of wine every night  She has a family history of sister and father with atrial fibrillation, and mother with myocardial infarction  Cardiac testing: Echo ordered and pending    TELE:       EKG:         Review of Systems   Constitutional: Positive for diaphoresis and fatigue  Negative for activity change and chills  HENT: Negative for ear discharge, nosebleeds and sore throat  Eyes: Negative for pain, discharge and visual disturbance  Respiratory: Positive for shortness of breath  Negative for cough and chest tightness  Cardiovascular: Positive for palpitations  Negative for chest pain and leg swelling  Gastrointestinal: Negative for abdominal pain, diarrhea, nausea and vomiting  Genitourinary: Negative for enuresis, flank pain and hematuria  Musculoskeletal: Negative for arthralgias, gait problem and myalgias  Skin: Negative for color change, pallor and rash  Neurological: Positive for light-headedness  Negative for dizziness and syncope  Hematological: Negative for adenopathy  Does not bruise/bleed easily  Psychiatric/Behavioral: Negative for behavioral problems, confusion and decreased concentration  ROS as noted above, otherwise 12 point review of systems was performed and is negative  Historical Information   Past Medical History:   Diagnosis Date    Cataract     bilateral    Chronic pain disorder     low back pain-sees a chiro    CPAP (continuous positive airway pressure) dependence     Disease of thyroid gland     hypo    Hearing aid worn     bilateral    History of infection due to penicillin-resistant Streptococcus pneumoniae 2005    [facility]    Hypertension     Sleep apnea     Wears glasses     Wears partial dentures     upper     Past Surgical History:   Procedure Laterality Date    CERVICAL SPINE SURGERY      x3    CHOLECYSTECTOMY      Lap    COLONOSCOPY      FRACTURE SURGERY      ORIF right elbow    HYSTERECTOMY      complete    WI XCAPSL CTRC RMVL INSJ IO LENS PROSTH W/O ECP Left 11/30/2020    Procedure: EXTRACTION EXTRACAPSULAR CATARACT PHACO INTRAOCULAR LENS (IOL); Surgeon: Katya Nair MD;  Location: [facility] OR;  Service: Ophthalmology    WI XCAPSL CTRC RMVL INSJ IO LENS PROSTH W/O ECP Right 1/18/2021    Procedure: EXTRACTION EXTRACAPSULAR CATARACT PHACO INTRAOCULAR LENS (IOL);   Surgeon: Katya Nair MD;  Location: [facility] OR;  Service: Ophthalmology    THORACOSCOPY W/ TALC PLEURODESIS      TONSILLECTOMY      TRIGGER FINGER RELEASE      thumb- release    WISDOM TOOTH EXTRACTION       Social History     Substance and Sexual Activity   Alcohol Use Yes    Alcohol/week: 7 0 standard drinks    Types: 7 Glasses of wine per week    Frequency: 4 or more times a week     Social History     Substance and Sexual Activity   Drug Use Never     Social History     Tobacco Use   Smoking Status Former Smoker    Quit date:     Years since quittin 3   Smokeless Tobacco Never Used     Family History:   Family History   Problem Relation Age of Onset    Diabetes Mother     Heart disease Mother         MI    Thyroid disease Mother         hypo    Cancer Father         prostate    Heart disease Father         aortic valve disease       Meds/Allergies   Hospital Medications:   Current Facility-Administered Medications   Medication Dose Route Frequency    aspirin (ECOTRIN LOW STRENGTH) EC tablet 81 mg  81 mg Oral Daily    ceftriaxone (ROCEPHIN) 1 g/50 mL in dextrose IVPB  1,000 mg Intravenous Q24H    enoxaparin (LOVENOX) subcutaneous injection 40 mg  40 mg Subcutaneous Daily    ipratropium-albuterol (DUO-NEB) 0 5-2 5 mg/3 mL inhalation solution 3 mL  3 mL Nebulization Q4H PRN    metoprolol succinate (TOPROL-XL) 24 hr tablet 25 mg  25 mg Oral Daily    saccharomyces boulardii (FLORASTOR) capsule 250 mg  250 mg Oral BID    sodium chloride 0 9 % infusion  75 mL/hr Intravenous Continuous     Home Medications: (Not in a hospital admission)      No Known Allergies    Objective   Vitals: Blood pressure 124/58, pulse 79, temperature 98 5 °F (36 9 °C), temperature source Oral, resp  rate (!) 24, weight 88 3 kg (194 lb 10 7 oz), SpO2 94 %  Orthostatic Blood Pressures      Most Recent Value   Blood Pressure  124/58 filed at 2021 1331   Patient Position - Orthostatic VS  Lying filed at 2021 1331          No intake or output data in the 24 hours ending 21 1420    Invasive Devices     Peripheral Intravenous Line            Peripheral IV 21 Left Antecubital less than 1 day                Physical Exam  Vitals signs reviewed     Constitutional: General: She is not in acute distress  Appearance: She is not ill-appearing or diaphoretic  HENT:      Head: Normocephalic and atraumatic  Right Ear: External ear normal       Left Ear: External ear normal       Nose: Nose normal    Eyes:      General:         Right eye: No discharge  Left eye: No discharge  Neck:      Musculoskeletal: No neck rigidity or muscular tenderness  Cardiovascular:      Rate and Rhythm: Normal rate and regular rhythm  Heart sounds: No murmur  No friction rub  Pulmonary:      Effort: Pulmonary effort is normal       Breath sounds: Normal breath sounds  No wheezing, rhonchi or rales  Abdominal:      General: There is no distension  Palpations: Abdomen is soft  Tenderness: There is no abdominal tenderness  Musculoskeletal:         General: No deformity or signs of injury  Right lower leg: No edema  Left lower leg: No edema  Skin:     General: Skin is warm and dry  Capillary Refill: Capillary refill takes less than 2 seconds  Coloration: Skin is not jaundiced or pale  Neurological:      General: No focal deficit present  Mental Status: She is alert and oriented to person, place, and time  Mental status is at baseline  Psychiatric:         Mood and Affect: Mood normal          Behavior: Behavior normal          Thought Content: Thought content normal        Lab Results: I have personally reviewed pertinent lab results      Results from last 7 days   Lab Units 05/05/21  0901   WBC Thousand/uL 9 20   HEMOGLOBIN g/dL 16 0*   HEMATOCRIT % 48 5*   PLATELETS Thousands/uL 354     Results from last 7 days   Lab Units 05/05/21  0901   POTASSIUM mmol/L 4 4   CHLORIDE mmol/L 110*   CO2 mmol/L 24   BUN mg/dL 17   CREATININE mg/dL 0 89   CALCIUM mg/dL 9 9     Results from last 7 days   Lab Units 05/05/21  0901   INR  0 99   PTT seconds 33           Imaging: I have personally reviewed pertinent films in PACS with a Radiologist   ECHO: Preliminary read showing preserved EF and no significant valvular disease     Cardiac testing:     CATH:  No results found for this or any previous visit  STRESS TEST:  No results found for this or any previous visit      VTE Prophylaxis: Eliquis

## 2021-05-05 NOTE — ASSESSMENT & PLAN NOTE
· Continue levothyroxine    The patient stated that she now takes 125 mcg  · TSH is within normal limits

## 2021-05-05 NOTE — ASSESSMENT & PLAN NOTE
Discussed the original EKG with cardiac electrophysiology  · As per EP could be SVT versus atrial flutter    She did sound irregular on physical exam  · Will start the patient on aspirin 81 mg plus Toprol   · Will monitor in telemetry  · Will consult Cardiac EP

## 2021-05-05 NOTE — ED ATTENDING ATTESTATION
Milbert Canavan, MD, saw and evaluated the patient  I have discussed the patient with the resident and agree with the resident's findings, Plan of Care, and MDM as documented in the resident's note, except where noted  All available labs and Radiology studies were reviewed  I was present for key portions of any procedure(s) performed by the resident and I was immediately available to provide assistance  At this point I agree with the current assessment done in the Emergency Department  I have conducted an independent evaluation of this patient a history and physical is as follows:    78 yo female with a complicated past medical history including HTN, COLIN, COPD, and hypothyroidism presents to the ED complaining of palpitations x 1 day  The patient reports "racing heart beats" --> HR between 108 and 140 since yesterday night around 2200  Home pule oximeter with readings in the low 90s  (+) Intermittent lightheadedeness  (+) Generalized fatigue x several weeks  No chest pain or shortness of breath  (+) Mild dysuria x several days  No fevers/chills  No other specific complaints  Gen: NAD, AA&Ox3  HEENT: PERRL, EOMI  Neck: supple  CV: (+) Tachycardia  Lungs: CT AB/L  Abdomen: soft, NT/ND  Ext: no tenderness or deformity  Neuro: 5/5 strength all extremities      ED Course  The patient is comfortable appearing but tachycardic in the 140s  EKG consistent with a slow SVT vs sinus tachycardia  Unclear etiology of elevated HR  Will check EKG, CXR, basic labs, troponin, BNP,and UA  Will continue to monitor in the ED --> she may require rate control if the SVT does not break  Plan for admission to B after workup completed        Critical Care Time  Procedures

## 2021-05-05 NOTE — ASSESSMENT & PLAN NOTE
Secondary to arrhythmia  · Monitor in telemetry  · 2D echocardiogram will be ordered  · Cardiology consultation requested

## 2021-05-05 NOTE — Clinical Note
Case was discussed with [facility] and the patient's admission status was agreed to be Admission Status: inpatient status to the service of Dr Noris Neville

## 2021-05-05 NOTE — ED PROVIDER NOTES
History  Chief Complaint   Patient presents with    Palpitations     since last night  pt reports low pulse ox and high heart rate measurements at home  pt denies chest pain     52-year-old female with no reported cardiac history presenting for episodes of palpitations which she states started last night around 10:00 p m  Charley Murray She does however state that she has been feeling sort of run down and fatigued for the last few weeks  Denies any history of atrial fibrillation  She states when she checked her heart rate last night that it was ranging between 108 & 140 when she was lying there  She also states that time that her pulse ox was hanging in the low 90s  She complains of some dysuria symptoms over last few days, states she is not having palpitations feeling more she does has lightheadedness which she describes as feeling like she is going to pass out although she states she did not pass out  Patient does have a history of blood clots however they were after an ICU stay a few years ago when she had Gram-negative sepsis, no other history of known blood clots and does not take blood thinners regularly  Patient denies any recent illness, fever, night sweats, chills, sore throat, cough, chest pain, shortness of breath, abdominal pain, nausea vomiting diarrhea constipation, hematuria  Prior to Admission Medications   Prescriptions Last Dose Informant Patient Reported? Taking?    Calcium 600 MG tablet   Yes No   Sig: Take by mouth daily at bedtime   Cholecalciferol (Vitamin D) 50 MCG (2000 UT) tablet   Yes No   Sig: Take by mouth 2 (two) times a day   Denosumab (PROLIA SC)   Yes No   Sig: Inject under the skin 2 x a yr   Ibuprofen-diphenhydrAMINE Cit (ADVIL PM PO)   Yes No   Sig: Take by mouth as needed   Multiple Vitamins-Minerals (CENTRUM SILVER PO)   Yes No   Sig: Take by mouth every morning   Probiotic Product (PROBIOTIC DAILY PO)   Yes No   Sig: Take by mouth every morning   cetirizine (ZyrTEC) 10 mg tablet   Yes No   Sig: Take 10 mg by mouth every morning   fluticasone-vilanterol (Breo Ellipta) 100-25 mcg/inh inhaler   Yes No   Sig: every morning   levothyroxine 112 mcg tablet   Yes No   Sig: every morning   losartan (COZAAR) 25 mg tablet   Yes No   Sig: daily at bedtime   meloxicam (MOBIC) 7 5 mg tablet   Yes No   Sig: Take 7 5 mg by mouth as needed    naproxen sodium (ALEVE) 220 MG tablet   Yes No   Sig: Take 220 mg by mouth every 12 (twelve) hours as needed   prednisoLONE acetate (PRED FORTE) 1 % ophthalmic suspension   Yes No   Si (four) times a day Post op      Facility-Administered Medications: None       Past Medical History:   Diagnosis Date    Cataract     bilateral    Chronic pain disorder     low back pain-sees a chiro    CPAP (continuous positive airway pressure) dependence     Disease of thyroid gland     hypo    Hearing aid worn     bilateral    History of infection due to penicillin-resistant Streptococcus pneumoniae     TALC    Hypertension     Sleep apnea     Wears glasses     Wears partial dentures     upper       Past Surgical History:   Procedure Laterality Date    CERVICAL SPINE SURGERY      x3    CHOLECYSTECTOMY      Lap    COLONOSCOPY      FRACTURE SURGERY      ORIF right elbow    HYSTERECTOMY      complete    NE XCAPSL CTRC RMVL INSJ IO LENS PROSTH W/O ECP Left 2020    Procedure: EXTRACTION EXTRACAPSULAR CATARACT PHACO INTRAOCULAR LENS (IOL); Surgeon: Katya Nair MD;  Location: [facility] MAIN OR;  Service: Ophthalmology    NE XCAPSL CTRC RMVL INSJ IO LENS PROSTH W/O ECP Right 2021    Procedure: EXTRACTION EXTRACAPSULAR CATARACT PHACO INTRAOCULAR LENS (IOL);   Surgeon: Katya Nair MD;  Location: [facility] OR;  Service: Ophthalmology    THORACOSCOPY W/ TALC PLEURODESIS      TONSILLECTOMY      TRIGGER FINGER RELEASE      thumb- release    WISDOM TOOTH EXTRACTION         Family History   Problem Relation Age of Onset    Diabetes Mother     Heart disease Mother         MI    Thyroid disease Mother         hypo    Cancer Father         prostate    Heart disease Father         aortic valve disease     I have reviewed and agree with the history as documented  E-Cigarette/Vaping     E-Cigarette/Vaping Substances     Social History     Tobacco Use    Smoking status: Former Smoker     Quit date:      Years since quittin 3    Smokeless tobacco: Never Used   Substance Use Topics    Alcohol use: Yes     Alcohol/week: 7 0 standard drinks     Types: 7 Glasses of wine per week     Frequency: 4 or more times a week    Drug use: Never        Review of Systems   Cardiovascular: Positive for palpitations  Neurological: Positive for light-headedness  All other systems reviewed and are negative  Physical Exam  ED Triage Vitals   Temperature Pulse Respirations Blood Pressure SpO2   21 0904 21 0904 21 0904 21 0904 21 0904   98 5 °F (36 9 °C) (!) 142 18 148/68 (!) 89 %      Temp Source Heart Rate Source Patient Position - Orthostatic VS BP Location FiO2 (%)   21 0904 21 0904 21 1331 21 1331 --   Oral Monitor Lying Right arm       Pain Score       21 2206       4             Orthostatic Vital Signs  Vitals:    21 2122 21 2354 21 0750 21 1350   BP: 139/71 135/70 116/63 133/78   Pulse: 78 68 67    Patient Position - Orthostatic VS:   Lying        Physical Exam  Vitals signs and nursing note reviewed  Constitutional:       General: She is not in acute distress  Appearance: She is well-developed  She is not diaphoretic  HENT:      Head: Normocephalic and atraumatic  Right Ear: External ear normal       Left Ear: External ear normal       Nose: Nose normal    Eyes:      General: No scleral icterus  Right eye: No discharge  Left eye: No discharge        Conjunctiva/sclera: Conjunctivae normal       Pupils: Pupils are equal, round, and reactive to light    Neck:      Musculoskeletal: Normal range of motion and neck supple  Vascular: No JVD  Trachea: No tracheal deviation  Cardiovascular:      Rate and Rhythm: Regular rhythm  Tachycardia present  Heart sounds: Normal heart sounds  No murmur  No friction rub  No gallop  Pulmonary:      Effort: Pulmonary effort is normal  No respiratory distress  Breath sounds: Normal breath sounds  No stridor  No wheezing or rales  Abdominal:      General: Bowel sounds are normal  There is no distension  Palpations: Abdomen is soft  There is no mass  Tenderness: There is no abdominal tenderness  There is no guarding  Musculoskeletal: Normal range of motion  General: No tenderness or deformity  Skin:     General: Skin is warm and dry  Coloration: Skin is not pale  Findings: No erythema or rash  Neurological:      General: No focal deficit present  Mental Status: She is alert and oriented to person, place, and time  Mental status is at baseline  Cranial Nerves: No cranial nerve deficit  Sensory: No sensory deficit  Motor: No weakness or abnormal muscle tone  Psychiatric:         Behavior: Behavior normal          Thought Content:  Thought content normal          Judgment: Judgment normal          ED Medications  Medications   ceftriaxone (ROCEPHIN) 1 g/50 mL in dextrose IVPB (1,000 mg Intravenous New Bag 5/6/21 1343)   saccharomyces boulardii (FLORASTOR) capsule 250 mg (250 mg Oral Given 5/6/21 0842)   sodium chloride 0 9 % infusion (0 mL/hr Intravenous Stopped 5/6/21 0121)   metoprolol succinate (TOPROL-XL) 24 hr tablet 25 mg (25 mg Oral Given 5/6/21 1346)   apixaban (ELIQUIS) tablet 5 mg (5 mg Oral Given 5/6/21 0842)   flecainide (TAMBOCOR) tablet 100 mg (100 mg Oral Not Given 5/6/21 0800)   albuterol (PROVENTIL HFA,VENTOLIN HFA) inhaler 2 puff (has no administration in time range)   sodium chloride 0 9 % bolus 1,000 mL (1,000 mL Intravenous New Bag 5/5/21 8570)   iohexol (OMNIPAQUE) 350 MG/ML injection (SINGLE-DOSE) 100 mL (100 mL Intravenous Given 5/5/21 1025)   acetaminophen (TYLENOL) tablet 650 mg (650 mg Oral Given 5/5/21 2246)   regadenoson (LEXISCAN) injection 0 4 mg (0 4 mg Intravenous Given by Other 5/6/21 1352)       Diagnostic Studies  Results Reviewed     Procedure Component Value Units Date/Time    Urine culture [[id number]]  (Abnormal) Collected: 05/05/21 1036    Lab Status: Preliminary result Specimen: Urine, Clean Catch Updated: 05/06/21 1013     Urine Culture >100,000 cfu/ml Gram Negative Pranav Enteric Like    Basic metabolic panel [[id number]]  (Abnormal) Collected: 05/06/21 0451    Lab Status: Final result Specimen: Blood from Arm, Right Updated: 05/06/21 0618     Sodium 141 mmol/L      Potassium 4 0 mmol/L      Chloride 112 mmol/L      CO2 22 mmol/L      ANION GAP 7 mmol/L      BUN 17 mg/dL      Creatinine 0 87 mg/dL      Glucose 90 mg/dL      Glucose, Fasting 90 mg/dL      Calcium 9 0 mg/dL      eGFR 66 ml/min/1 73sq m     Narrative:      [place] guidelines for Chronic Kidney Disease (CKD):     Stage 1 with normal or high GFR (GFR > 90 mL/min/1 73 square meters)    Stage 2 Mild CKD (GFR = 60-89 mL/min/1 73 square meters)    Stage 3A Moderate CKD (GFR = 45-59 mL/min/1 73 square meters)    Stage 3B Moderate CKD (GFR = 30-44 mL/min/1 73 square meters)    Stage 4 Severe CKD (GFR = 15-29 mL/min/1 73 square meters)    Stage 5 End Stage CKD (GFR <15 mL/min/1 73 square meters)  Note: GFR calculation is accurate only with a steady state creatinine    Magnesium [[id number]]  (Normal) Collected: 05/06/21 0451    Lab Status: Final result Specimen: Blood from Arm, Right Updated: 05/06/21 0618     Magnesium 2 1 mg/dL     Hepatic function panel [[id number]]  (Abnormal) Collected: 05/06/21 0451    Lab Status: Final result Specimen: Blood from Arm, Right Updated: 05/06/21 0618     Total Bilirubin 0 97 mg/dL      Bilirubin, Direct 0 40 mg/dL      Alkaline Phosphatase 419 U/L       U/L       U/L      Total Protein 7 4 g/dL      Albumin 3 0 g/dL     CBC (With Platelets) [[id number]]  (Abnormal) Collected: 05/06/21 0451    Lab Status: Final result Specimen: Blood from Arm, Right Updated: 05/06/21 0536     WBC 10 17 Thousand/uL      RBC 4 19 Million/uL      Hemoglobin 14 1 g/dL      Hematocrit 43 4 %       fL      MCH 33 7 pg      MCHC 32 5 g/dL      RDW 13 9 %      Platelets 685 Thousands/uL      MPV 10 3 fL     NOVEL CORONAVIRUS (COVID-19), PCR [facility] [[id number]]  (Normal) Resulted: 05/05/21 1935    Lab Status: Final result Specimen: Nares from Nose Updated: 05/05/21 1935     SARS-CoV-2 Negative    Narrative:           Blood culture [[id number]] Collected: 05/05/21 1131    Lab Status: Preliminary result Specimen: Blood from Hand, Right Updated: 05/05/21 1601     Blood Culture Received in Microbiology Lab  Culture in Progress  Blood culture [[id number]] Collected: 05/05/21 1131    Lab Status: Preliminary result Specimen: Blood from Arm, Left Updated: 05/05/21 1601     Blood Culture Received in Microbiology Lab  Culture in Progress  NT-BNP PRO [[id number]]  (Abnormal) Collected: 05/05/21 0901    Lab Status: Final result Specimen: Blood from Arm, Left Updated: 05/05/21 1537     NT-proBNP 710 pg/mL     Lactic acid, plasma [[id number]]  (Normal) Collected: 05/05/21 1131    Lab Status: Final result Specimen: Blood from Arm, Left Updated: 05/05/21 1213     LACTIC ACID 2 0 mmol/L     Narrative:      Result may be elevated if tourniquet was used during collection      Hepatitis panel, acute [[id number]]  (Normal) Collected: 05/05/21 1036    Lab Status: Final result Specimen: Blood from Arm, Left Updated: 05/05/21 1159     Hepatitis B Surface Ag Non-reactive     Hep A IgM Non-reactive     Hepatitis C Ab Non-reactive     Hep B C IgM Non-reactive    Urine Microscopic [[id number]]  (Abnormal) Collected: 05/05/21 1036    Lab Status: Final result Specimen: Urine, Clean Catch Updated: 05/05/21 1052     RBC, UA 4-10 /hpf      WBC, UA Innumerable /hpf      Epithelial Cells None Seen /hpf      Bacteria, UA Innumerable /hpf      Hyaline Casts, UA None Seen /lpf     UA w Reflex to Microscopic w Reflex to Culture [[id number]]  (Abnormal) Collected: 05/05/21 1036    Lab Status: Final result Specimen: Urine, Clean Catch Updated: 05/05/21 1047     Color, UA Yellow     Clarity, UA Cloudy     Specific [place], UA 1 010     pH, UA 6 5     Leukocytes, UA Large     Nitrite, UA Positive     Protein, UA Negative mg/dl      Glucose, UA Negative mg/dl      Ketones, UA Negative mg/dl      Urobilinogen, UA 1 0 E U /dl      Bilirubin, UA Negative     Blood, UA Trace    TSH, 3rd generation with Free T4 reflex [[id number]]  (Normal) Collected: 05/05/21 0901    Lab Status: Final result Specimen: Blood from Arm, Left Updated: 05/05/21 0941     TSH 3RD GENERATON 2 580 uIU/mL     Narrative:      Patients undergoing fluorescein dye angiography may retain small amounts of fluorescein in the body for 48-72 hours post procedure  Samples containing fluorescein can produce falsely depressed TSH values  If the patient had this procedure,a specimen should be resubmitted post fluorescein clearance        Comprehensive metabolic panel [[id number]]  (Abnormal) Collected: 05/05/21 0901    Lab Status: Final result Specimen: Blood from Arm, Left Updated: 05/05/21 0934     Sodium 139 mmol/L      Potassium 4 4 mmol/L      Chloride 110 mmol/L      CO2 24 mmol/L      ANION GAP 5 mmol/L      BUN 17 mg/dL      Creatinine 0 89 mg/dL      Glucose 115 mg/dL      Calcium 9 9 mg/dL      Corrected Calcium 10 4 mg/dL       U/L       U/L      Alkaline Phosphatase 483 U/L      Total Protein 8 3 g/dL      Albumin 3 4 g/dL      Total Bilirubin 0 72 mg/dL      eGFR 64 ml/min/1 73sq m     Narrative:      [place] guidelines for Chronic Kidney Disease (CKD):     Stage 1 with normal or high GFR (GFR > 90 mL/min/1 73 square meters)    Stage 2 Mild CKD (GFR = 60-89 mL/min/1 73 square meters)    Stage 3A Moderate CKD (GFR = 45-59 mL/min/1 73 square meters)    Stage 3B Moderate CKD (GFR = 30-44 mL/min/1 73 square meters)    Stage 4 Severe CKD (GFR = 15-29 mL/min/1 73 square meters)    Stage 5 End Stage CKD (GFR <15 mL/min/1 73 square meters)  Note: GFR calculation is accurate only with a steady state creatinine    Troponin I [[id number]]  (Normal) Collected: 05/05/21 0901    Lab Status: Final result Specimen: Blood from Arm, Left Updated: 05/05/21 0934     Troponin I <0 02 ng/mL     D-dimer, quantitative [[id number]]  (Normal) Collected: 05/05/21 0901    Lab Status: Final result Specimen: Blood from Arm, Left Updated: 05/05/21 0930     D-Dimer, Quant 0 32 ug/ml FEU     Protime-INR [[id number]]  (Normal) Collected: 05/05/21 0901    Lab Status: Final result Specimen: Blood from Arm, Left Updated: 05/05/21 0928     Protime 13 1 seconds      INR 0 99    APTT [[id number]]  (Normal) Collected: 05/05/21 0901    Lab Status: Final result Specimen: Blood from Arm, Left Updated: 05/05/21 0928     PTT 33 seconds     CBC and differential [[id number]]  (Abnormal) Collected: 05/05/21 0901    Lab Status: Final result Specimen: Blood from Arm, Left Updated: 05/05/21 0917     WBC 9 20 Thousand/uL      RBC 4 73 Million/uL      Hemoglobin 16 0 g/dL      Hematocrit 48 5 %       fL      MCH 33 8 pg      MCHC 33 0 g/dL      RDW 13 6 %      MPV 9 5 fL      Platelets 787 Thousands/uL      nRBC 0 /100 WBCs      Neutrophils Relative 65 %      Immat GRANS % 0 %      Lymphocytes Relative 19 %      Monocytes Relative 7 %      Eosinophils Relative 8 %      Basophils Relative 1 %      Neutrophils Absolute 6 01 Thousands/µL      Immature Grans Absolute 0 03 Thousand/uL      Lymphocytes Absolute 1 77 Thousands/µL      Monocytes Absolute 0 60 Thousand/µL      Eosinophils Absolute 0 71 Thousand/µL      Basophils Absolute 0 08 Thousands/µL                  CT abdomen pelvis with contrast   Final Result by Elizabeth Aranda MD (05/05 1126)   Hepatic steatosis   No biliary dilation seen   No hypervascular lesion in the liver            Workstation performed: [id number]         XR chest 1 view portable   Final Result by Nita Blount MD (05/05 1232)      No acute cardiopulmonary disease  Workstation performed: [id number]               Procedures  ECG 12 Lead Documentation Only    Date/Time: 5/5/2021 12:40 PM  Performed by: Nelly Latham DO  Authorized by: Nelly Latham DO     Indications / Diagnosis:  Lightheaded and tachycardia  ECG reviewed by me, the ED Provider: yes    Patient location:  ED  Previous ECG:     Previous ECG:  Compared to current  Interpretation:     Interpretation: abnormal    Rate:     ECG rate:  144    ECG rate assessment: tachycardic    Rhythm:     Rhythm: sinus tachycardia    Ectopy:     Ectopy: none    QRS:     QRS axis:  Normal    QRS intervals:  Normal  Conduction:     Conduction: normal    ST segments:     ST segments:  Normal  T waves:     T waves: normal    ECG 12 Lead Documentation Only    Date/Time: 5/5/2021 12:41 PM  Performed by: Nelly Latham DO  Authorized by: Nelly Latham DO     Indications / Diagnosis:  Breaking tachycardia  Patient location:  ED  Previous ECG:     Previous ECG:  Compared to current  Interpretation:     Interpretation: normal    Rate:     ECG rate:  86    ECG rate assessment: normal    Rhythm:     Rhythm: sinus rhythm    Ectopy:     Ectopy: none    QRS:     QRS axis:  Normal    QRS intervals:  Normal  Conduction:     Conduction: normal    ST segments:     ST segments:  Normal  T waves:     T waves: normal            ED Course  ED Course as of May 06 1407   Wed May 05, 2021   0948 AST(!): 109   4798 Will check hepatitis panel and CT abdomen pelvis    Gallbladder removed in 1998, hasnt taken tylenol in 3 months, drinks 1-2 wine glasses nightly    ALT(!): 168   1053 Bacteria, UA(!): Innumerable   1053 WBC, UA(!): Innumerable   1054 Nitrite, UA(!): Positive             HEART Risk Score      Most Recent Value   Heart Score Risk Calculator   History  0 Filed at: 05/05/2021 1245   ECG  0 Filed at: 05/05/2021 1245   Age  2 Filed at: 05/05/2021 1245   Risk Factors  1 Filed at: 05/05/2021 1245   Troponin  0 Filed at: 05/05/2021 1245   HEART Score  3 Filed at: 05/05/2021 1245        Identification of Seniors at Risk      Most Recent Value   (ISAR) Identification of Seniors at Risk   Before the illness or injury that brought you to the Emergency, did you need someone to help you on a regular basis? 0 Filed at: 05/05/2021 0905   In the last 24 hours, have you needed more help than usual?  0 Filed at: 05/05/2021 0501   Have you been hospitalized for one or more nights during the past 6 months? 0 Filed at: 05/05/2021 0905   In general, do you see well?  0 Filed at: 05/05/2021 6140   In general, do you have serious problems with your memory? 0 Filed at: 05/05/2021 1010   Do you take more than three different medications every day? 1 Filed at: 05/05/2021 8162   ISAR Score  1 Filed at: 05/05/2021 5082                Initial Sepsis Screening     Row Name 05/05/21 1236                Is the patient's history suggestive of a new or worsening infection? (!) Yes (Proceed)  -DH        Suspected source of infection  urinary tract infection  -DH        Are two or more of the following signs & symptoms of infection both present and new to the patient?   (!) Yes (Proceed)  -DH        Indicate SIRS criteria  Tachycardia > 90 bpm;Tachypnea > 20 resp per min  -DH        If the answer is yes to both questions, suspicion of sepsis is present  --        If severe sepsis is present AND tissue hypoperfusion perists in the hour after fluid resuscitation or lactate > 4, the patient meets criteria for SEPTIC SHOCK  --        Are any of the following organ dysfunction criteria present within 6 hours of suspected infection and SIRS criteria that are NOT considered to be chronic conditions? (!) Yes  -DH        Organ dysfunction  Lactate > 2 0 mmol/L  -        Date of presentation of severe sepsis  --        Time of presentation of severe sepsis  --        Tissue hypoperfusion persists in the hour after crystalloid fluid administration, evidenced, by either:  --        Was hypotension present within one hour of the conclusion of crystalloid fluid administration? No  -        Date of presentation of septic shock  --        Time of presentation of septic shock  --          User Key  (r) = Recorded By, (t) = Taken By, (c) = Cosigned By    [address] Name Provider Type    [facility]  [facility], DO Physician                    MDM  Number of Diagnoses or Management Options  Sepsis [facility]):   SVT (supraventricular tachycardia) ([facility] ):   UTI (urinary tract infection):   Diagnosis management comments: Rapid heart rate with noted Urosepsis  Antibiotics, fluids and admit telemetry  Disposition  Final diagnoses:   UTI (urinary tract infection)   Sepsis ([facility] )   SVT (supraventricular tachycardia) ([place] [address] )     Time reflects when diagnosis was documented in both MDM as applicable and the Disposition within this note     Time User Action Codes Description Comment    5/5/2021 12:50 PM Liborio Quezada Add [N39 0] UTI (urinary tract infection)     5/5/2021 12:50 PM Liborio Feeler Add [A41 9] [facility]     5/5/2021 12:50 PM Armida Pryor Add [I47 1] SVT (supraventricular tachycardia) ([facility] )     5/5/2021 12:53 PM Arianne Living Add [R00 2] Palpitations       ED Disposition     ED Disposition Condition Date/Time Comment    Admit Stable Wed May 5, 2021  1:01 PM Case was discussed with [facility] and the patient's admission status was agreed to be Admission Status: Observation status to the service of Dr Fabian Davis          Follow-up Information    None         Current Discharge Medication List START taking these medications    Details   apixaban (ELIQUIS) 5 mg Take 1 tablet (5 mg total) by mouth 2 (two) times a day  Qty: 60 tablet, Refills: 0    Comments: Please price check and send to Maren Cox PA-C prior to filling, thank you  Associated Diagnoses: SVT (supraventricular tachycardia) ([facility] )         CONTINUE these medications which have NOT CHANGED    Details   Calcium 600 MG tablet Take by mouth daily at bedtime      cetirizine (ZyrTEC) 10 mg tablet Take 10 mg by mouth every morning      Cholecalciferol (Vitamin D) 50 MCG (2000 UT) tablet Take by mouth 2 (two) times a day      Denosumab (PROLIA SC) Inject under the skin 2 x a yr      fluticasone-vilanterol (Breo Ellipta) 100-25 mcg/inh inhaler every morning      Ibuprofen-diphenhydrAMINE Cit (ADVIL PM PO) Take by mouth as needed      levothyroxine 112 mcg tablet every morning      losartan (COZAAR) 25 mg tablet daily at bedtime      meloxicam (MOBIC) 7 5 mg tablet Take 7 5 mg by mouth as needed       Multiple Vitamins-Minerals (CENTRUM SILVER PO) Take by mouth every morning      naproxen sodium (ALEVE) 220 MG tablet Take 220 mg by mouth every 12 (twelve) hours as needed      prednisoLONE acetate (PRED FORTE) 1 % ophthalmic suspension 4 (four) times a day Post op      Probiotic Product (PROBIOTIC DAILY PO) Take by mouth every morning           No discharge procedures on file  PDMP Review     None           ED Provider  Attending physically available and evaluated Segundo Judy URIARTE managed the patient along with the ED Attending      Electronically Signed by         Jacoby Fisher DO  05/06/21 4397

## 2021-05-05 NOTE — ASSESSMENT & PLAN NOTE
· Continue CPAP    The patient stated that she has been compliant with the CPAP  · She is already established with a pulmonologist and last saw him 3 weeks ago

## 2021-05-05 NOTE — SEPSIS NOTE
Sepsis Note   Howard Bruner 77 y o  female MRN: [id number]  Unit/Bed#: ED 08 Encounter: [id number]      qSOFA     Row Name 05/05/21 0930 05/05/21 0904             Altered mental status GCS < 15  --  --       Respiratory Rate > / =22  1  0       Systolic BP < / =324  0  0       Q Sofa Score  1  0           Initial Sepsis Screening     Row Name 05/05/21 1236                Is the patient's history suggestive of a new or worsening infection? (!) Yes (Proceed)  -DH        Suspected source of infection  urinary tract infection  -DH        Are two or more of the following signs & symptoms of infection both present and new to the patient? (!) Yes (Proceed)  -DH        Indicate SIRS criteria  Tachycardia > 90 bpm;Tachypnea > 20 resp per min  -DH        If the answer is yes to both questions, suspicion of sepsis is present  --        If severe sepsis is present AND tissue hypoperfusion perists in the hour after fluid resuscitation or lactate > 4, the patient meets criteria for SEPTIC SHOCK  --        Are any of the following organ dysfunction criteria present within 6 hours of suspected infection and SIRS criteria that are NOT considered to be chronic conditions? (!) Yes  -DH        Organ dysfunction  Lactate > 2 0 mmol/L  -DH        Date of presentation of severe sepsis  --        Time of presentation of severe sepsis  --        Tissue hypoperfusion persists in the hour after crystalloid fluid administration, evidenced, by either:  --        Was hypotension present within one hour of the conclusion of crystalloid fluid administration?   No  -DH        Date of presentation of septic shock  --        Time of presentation of septic shock  --          User Key  (r) = Recorded By, (t) = Taken By, (c) = Cosigned By    [address] Name Provider Type    Elo  33, DO Physician

## 2021-05-06 ENCOUNTER — APPOINTMENT (OUTPATIENT)
Dept: RADIOLOGY | Facility: HOSPITAL | Age: 76
End: 2021-05-06
Payer: MEDICARE

## 2021-05-06 ENCOUNTER — APPOINTMENT (OUTPATIENT)
Dept: NON INVASIVE DIAGNOSTICS | Facility: HOSPITAL | Age: 76
End: 2021-05-06
Payer: MEDICARE

## 2021-05-06 VITALS
RESPIRATION RATE: 18 BRPM | DIASTOLIC BLOOD PRESSURE: 63 MMHG | HEART RATE: 67 BPM | TEMPERATURE: 97.8 F | SYSTOLIC BLOOD PRESSURE: 84 MMHG | OXYGEN SATURATION: 97 % | WEIGHT: 194.67 LBS | BODY MASS INDEX: 38.34 KG/M2

## 2021-05-06 DIAGNOSIS — I48.92 ATRIAL FLUTTER, UNSPECIFIED TYPE (HCC): Primary | ICD-10-CM

## 2021-05-06 PROBLEM — R74.01 TRANSAMINITIS: Status: ACTIVE | Noted: 2021-05-06

## 2021-05-06 LAB
ALBUMIN SERPL BCP-MCNC: 3 G/DL (ref 3.5–5)
ALP SERPL-CCNC: 419 U/L (ref 46–116)
ALT SERPL W P-5'-P-CCNC: 159 U/L (ref 12–78)
ANION GAP SERPL CALCULATED.3IONS-SCNC: 7 MMOL/L (ref 4–13)
AST SERPL W P-5'-P-CCNC: 109 U/L (ref 5–45)
ATRIAL RATE: 70 BPM
BILIRUB DIRECT SERPL-MCNC: 0.4 MG/DL (ref 0–0.2)
BILIRUB SERPL-MCNC: 0.97 MG/DL (ref 0.2–1)
BUN SERPL-MCNC: 17 MG/DL (ref 5–25)
CALCIUM SERPL-MCNC: 9 MG/DL (ref 8.3–10.1)
CHEST PAIN STATEMENT: NORMAL
CHLORIDE SERPL-SCNC: 112 MMOL/L (ref 100–108)
CO2 SERPL-SCNC: 22 MMOL/L (ref 21–32)
CREAT SERPL-MCNC: 0.87 MG/DL (ref 0.6–1.3)
ERYTHROCYTE [DISTWIDTH] IN BLOOD BY AUTOMATED COUNT: 13.9 % (ref 11.6–15.1)
GFR SERPL CREATININE-BSD FRML MDRD: 66 ML/MIN/1.73SQ M
GLUCOSE P FAST SERPL-MCNC: 90 MG/DL (ref 65–99)
GLUCOSE SERPL-MCNC: 90 MG/DL (ref 65–140)
HCT VFR BLD AUTO: 43.4 % (ref 34.8–46.1)
HGB BLD-MCNC: 14.1 G/DL (ref 11.5–15.4)
MAGNESIUM SERPL-MCNC: 2.1 MG/DL (ref 1.6–2.6)
MAX DIASTOLIC BP: 81 MMHG
MAX HEART RATE: 81 BPM
MAX PREDICTED HEART RATE: 146 BPM
MAX. SYSTOLIC BP: 145 MMHG
MCH RBC QN AUTO: 33.7 PG (ref 26.8–34.3)
MCHC RBC AUTO-ENTMCNC: 32.5 G/DL (ref 31.4–37.4)
MCV RBC AUTO: 104 FL (ref 82–98)
P AXIS: 52 DEGREES
PLATELET # BLD AUTO: 310 THOUSANDS/UL (ref 149–390)
PMV BLD AUTO: 10.3 FL (ref 8.9–12.7)
POTASSIUM SERPL-SCNC: 4 MMOL/L (ref 3.5–5.3)
PR INTERVAL: 174 MS
PROT SERPL-MCNC: 7.4 G/DL (ref 6.4–8.2)
PROTOCOL NAME: NORMAL
QRS AXIS: 46 DEGREES
QRSD INTERVAL: 80 MS
QT INTERVAL: 416 MS
QTC INTERVAL: 449 MS
RBC # BLD AUTO: 4.19 MILLION/UL (ref 3.81–5.12)
REASON FOR TERMINATION: NORMAL
SODIUM SERPL-SCNC: 141 MMOL/L (ref 136–145)
T WAVE AXIS: 37 DEGREES
TARGET HR FORMULA: NORMAL
TEST INDICATION: NORMAL
TIME IN EXERCISE PHASE: NORMAL
VENTRICULAR RATE: 70 BPM
WBC # BLD AUTO: 10.17 THOUSAND/UL (ref 4.31–10.16)

## 2021-05-06 PROCEDURE — 78452 HT MUSCLE IMAGE SPECT MULT: CPT | Performed by: INTERNAL MEDICINE

## 2021-05-06 PROCEDURE — 93010 ELECTROCARDIOGRAM REPORT: CPT | Performed by: INTERNAL MEDICINE

## 2021-05-06 PROCEDURE — 83735 ASSAY OF MAGNESIUM: CPT | Performed by: HOSPITALIST

## 2021-05-06 PROCEDURE — A9502 TC99M TETROFOSMIN: HCPCS

## 2021-05-06 PROCEDURE — 99225 PR SBSQ OBSERVATION CARE/DAY 25 MINUTES: CPT | Performed by: INTERNAL MEDICINE

## 2021-05-06 PROCEDURE — 94660 CPAP INITIATION&MGMT: CPT

## 2021-05-06 PROCEDURE — 80048 BASIC METABOLIC PNL TOTAL CA: CPT | Performed by: HOSPITALIST

## 2021-05-06 PROCEDURE — 93016 CV STRESS TEST SUPVJ ONLY: CPT | Performed by: INTERNAL MEDICINE

## 2021-05-06 PROCEDURE — G1004 CDSM NDSC: HCPCS

## 2021-05-06 PROCEDURE — 78452 HT MUSCLE IMAGE SPECT MULT: CPT

## 2021-05-06 PROCEDURE — 99217 PR OBSERVATION CARE DISCHARGE MANAGEMENT: CPT | Performed by: PHYSICIAN ASSISTANT

## 2021-05-06 PROCEDURE — 80076 HEPATIC FUNCTION PANEL: CPT | Performed by: HOSPITALIST

## 2021-05-06 PROCEDURE — 85027 COMPLETE CBC AUTOMATED: CPT | Performed by: HOSPITALIST

## 2021-05-06 PROCEDURE — 93018 CV STRESS TEST I&R ONLY: CPT | Performed by: INTERNAL MEDICINE

## 2021-05-06 PROCEDURE — 94760 N-INVAS EAR/PLS OXIMETRY 1: CPT

## 2021-05-06 PROCEDURE — 93017 CV STRESS TEST TRACING ONLY: CPT

## 2021-05-06 RX ORDER — METOPROLOL SUCCINATE 25 MG/1
25 TABLET, EXTENDED RELEASE ORAL DAILY
Qty: 30 TABLET | Refills: 0 | Status: SHIPPED | OUTPATIENT
Start: 2021-05-07 | End: 2021-06-21 | Stop reason: SDUPTHER

## 2021-05-06 RX ORDER — AMINOPHYLLINE DIHYDRATE 25 MG/ML
INJECTION, SOLUTION INTRAVENOUS
Status: DISCONTINUED
Start: 2021-05-06 | End: 2021-05-06 | Stop reason: WASHOUT

## 2021-05-06 RX ORDER — CEPHALEXIN 500 MG/1
500 CAPSULE ORAL EVERY 12 HOURS SCHEDULED
Qty: 12 CAPSULE | Refills: 0 | Status: SHIPPED | OUTPATIENT
Start: 2021-05-06 | End: 2021-05-12

## 2021-05-06 RX ORDER — FLECAINIDE ACETATE 100 MG/1
100 TABLET ORAL EVERY 12 HOURS SCHEDULED
Qty: 60 TABLET | Refills: 0 | Status: SHIPPED | OUTPATIENT
Start: 2021-05-06 | End: 2021-06-02

## 2021-05-06 RX ADMIN — CEFTRIAXONE 1000 MG: 1 INJECTION, POWDER, FOR SOLUTION INTRAMUSCULAR; INTRAVENOUS at 13:43

## 2021-05-06 RX ADMIN — Medication 250 MG: at 08:42

## 2021-05-06 RX ADMIN — REGADENOSON 0.4 MG: 0.08 INJECTION, SOLUTION INTRAVENOUS at 13:52

## 2021-05-06 RX ADMIN — METOPROLOL SUCCINATE 25 MG: 25 TABLET, FILM COATED, EXTENDED RELEASE ORAL at 13:46

## 2021-05-06 RX ADMIN — REGADENOSON 0.4 MG: 0.08 INJECTION, SOLUTION INTRAVENOUS at 11:39

## 2021-05-06 RX ADMIN — APIXABAN 5 MG: 5 TABLET, FILM COATED ORAL at 08:42

## 2021-05-06 NOTE — PLAN OF CARE
Problem: PAIN - ADULT  Goal: Verbalizes/displays adequate comfort level or baseline comfort level  Description: Interventions:  - Encourage patient to monitor pain and request assistance  - Assess pain using appropriate pain scale  - Administer analgesics based on type and severity of pain and evaluate response  - Implement non-pharmacological measures as appropriate and evaluate response  - Consider cultural and social influences on pain and pain management  - Notify physician/advanced practitioner if interventions unsuccessful or patient reports new pain  Outcome: Progressing     Problem: INFECTION - ADULT  Goal: Absence or prevention of progression during hospitalization  Description: INTERVENTIONS:  - Assess and monitor for signs and symptoms of infection  - Monitor lab/diagnostic results  - Monitor all insertion sites, i e  indwelling lines, tubes, and drains  - Monitor endotracheal if appropriate and nasal secretions for changes in amount and color  - [place] appropriate cooling/warming therapies per order  - Administer medications as ordered  - Instruct and encourage patient and family to use good hand hygiene technique  - Identify and instruct in appropriate isolation precautions for identified infection/condition  5/6/2021 0048 by Marie Pearl  Outcome: Progressing  5/6/2021 0047 by Marie Pearl  Outcome: Progressing  Goal: Absence of fever/infection during neutropenic period  Description: INTERVENTIONS:  - Monitor WBC    5/6/2021 0048 by Marie Pearl  Outcome: Progressing  5/6/2021 0047 by Marie Pearl  Outcome: Progressing     Problem: SAFETY ADULT  Goal: Patient will remain free of falls  Description: INTERVENTIONS:  - Assess patient frequently for physical needs  -  Identify cognitive and physical deficits and behaviors that affect risk of falls    -  [place] fall precautions as indicated by assessment   - Educate patient/family on patient safety including physical limitations  - Instruct patient to call for assistance with activity based on assessment  - Modify environment to reduce risk of injury  - Consider OT/PT consult to assist with strengthening/mobility  Outcome: Progressing  Goal: Maintain or return to baseline ADL function  Description: INTERVENTIONS:  -  Assess patient's ability to carry out ADLs; assess patient's baseline for ADL function and identify physical deficits which impact ability to perform ADLs (bathing, care of mouth/teeth, toileting, grooming, dressing, etc )  - Assess/evaluate cause of self-care deficits   - Assess range of motion  - Assess patient's mobility; develop plan if impaired  - Assess patient's need for assistive devices and provide as appropriate  - Encourage maximum independence but intervene and supervise when necessary  - Involve family in performance of ADLs  - Assess for home care needs following discharge   - Consider OT consult to assist with ADL evaluation and planning for discharge  - Provide patient education as appropriate  Outcome: Progressing  Goal: Maintain or return mobility status to optimal level  Description: INTERVENTIONS:  - Assess patient's baseline mobility status (ambulation, transfers, stairs, etc )    - Identify cognitive and physical deficits and behaviors that affect mobility  - Identify mobility aids required to assist with transfers and/or ambulation (gait belt, sit-to-stand, lift, walker, cane, etc )  - [place] fall precautions as indicated by assessment  - Record patient progress and toleration of activity level on Mobility SBAR; progress patient to next Phase/Stage  - Instruct patient to call for assistance with activity based on assessment  - Consider rehabilitation consult to assist with strengthening/weightbearing, etc   Outcome: Progressing     Problem: DISCHARGE PLANNING  Goal: Discharge to home or other facility with appropriate resources  Description: INTERVENTIONS:  - Identify barriers to discharge w/patient and caregiver  - Arrange for needed discharge resources and transportation as appropriate  - Identify discharge learning needs (meds, wound care, etc )  - Arrange for interpretive services to assist at discharge as needed  - Refer to Case Management Department for coordinating discharge planning if the patient needs post-hospital services based on physician/advanced practitioner order or complex needs related to functional status, cognitive ability, or social support system  Outcome: Progressing     Problem: Knowledge Deficit  Goal: Patient/family/caregiver demonstrates understanding of disease process, treatment plan, medications, and discharge instructions  Description: Complete learning assessment and assess knowledge base    Interventions:  - Provide teaching at level of understanding  - Provide teaching via preferred learning methods  Outcome: Progressing

## 2021-05-06 NOTE — NURSING NOTE
Patient stable for discharge home  Instructions reviewed and patient verbalized understanding  Medications to be picked up at [facility]

## 2021-05-06 NOTE — DISCHARGE SUMMARY
[facility]  Discharge- Howard Bruner 1945, 77 y o  female MRN: [id number]  Unit/Bed#: CW2 216-01 Encounter: [id number]  Primary Care Provider: LAVONNE Heaton   Date and time admitted to hospital: 5/5/2021  8:38 AM    * Arrhythmia  Assessment & Plan  Discussed the original EKG with cardiac electrophysiology  · As per EP, most likely a fib/flutter   · [id number] score = 5  Started on Eliquis for [facility]  · EP recommending to start Toprol XL 25 mg daily and flecainide 100 mg BID  · Echo reviewed - EF 60%, no RWMA, pulmonary HTN   · Stress test result pending   · Outpt f/u with EP/cardiology     Transaminitis  Assessment & Plan  · Unclear etiology, Alk phos, AST, ALT elevated  Unchanged today from labs yesterday   · CT a/p revealed hepatic steatosis     Pitting edema  Assessment & Plan  On the right > left side  As per the daughter this is chronic  · Patient had last echocardiogram 3 years ago  Will repeat another  · Echo 5/5/21: EF 60%, no RWMA, trace regurgitation of mitral and tricuspid valves, mild pulmonary HTN     Former tobacco use  Assessment & Plan  · As per the patient    Palpitations  Assessment & Plan  Secondary to arrhythmia  · Monitor on telemetry  · Echo reviewed   · EP following - see primary problem     COPD (chronic obstructive pulmonary disease) ([address] )  Assessment & Plan  · Will order neb treatments as needed for shortness of breath  · Continue Breo Ellipta    Hypothyroidism  Assessment & Plan  · Continue levothyroxine  The patient stated that she now takes 125 mcg  · TSH is within normal limits    Sleep apnea  Assessment & Plan  · Continue CPAP    The patient stated that she has been compliant with the CPAP  · She is already established with a pulmonologist and last saw him 3 weeks ago    HTN (hypertension)  Assessment & Plan  · D/c losartan as now starting metoprolol and flecainide, BP on lower side  · Outpt f/u         Discharging Physician / Practitioner: Sandy Perez PA-C  PCP: Rocio Brunson, [address]  Admission Date:   Admission Orders (From admission, onward)     Ordered        05/05/21 1301  Place in Observation  Once         05/05/21 1249  Inpatient Admission  Once,   Status:  Canceled                   Discharge Date: 05/06/21    Resolved Problems  Date Reviewed: 5/6/2021    None          Consultations During Hospital Stay:  · EP     Procedures Performed:   · None    Significant Findings / Test Results:   · New onset a fib/flutter   · CXR negative  · Echo EF 60%, no RWMA    Incidental Findings:   · None     Test Results Pending at Discharge (will require follow up): · Stress test     Outpatient Tests Requested:  · None    Complications:  None    Reason for Admission:  A fib/flutter     Hospital Course:     Brown Donahue is a 77 y o  female patient who originally presented to the hospital on 5/5/2021 due to palpitations, lightheadedness  Palpitations started night prior to admission, she took her pulse and noted it was irregular and elevated  This was associated with lightheadedness and near syncope  EKG showed a ventricular rate of 142 in the ER but then arrhythmia broke on its own  She was seen by EP, who felt this likely represented new onset atrial fibrillation/flutter  Based on her chads 2 Vasc score, she was started on anticoagulation with Eliquis  She was started on metoprolol and flecainide  Her echocardiogram was unremarkable for a stress test was performed, final result is pending but per EP she was cleared for discharge from their standpoint with outpatient follow-up  Patient's arrhythmia has resolved without recurrence presently  She feels well  Additionally, it is noted the patient has UTI, she will be discharged on Keflex to complete oral antibiotics  Please see above list of diagnoses and related plan for additional information       Condition at Discharge: good     Discharge Day Visit / Exam:     Subjective: The patient has no complaints, feels well   Vitals: Blood Pressure: 133/78 (05/06/21 1350)  Pulse: 67 (05/06/21 0750)  Temperature: 97 8 °F (36 6 °C) (05/06/21 0750)  Temp Source: Oral (05/06/21 0750)  Respirations: 18 (05/06/21 0750)  Weight - Scale: 88 3 kg (194 lb 10 7 oz) (05/05/21 0904)  SpO2: 91 % (05/06/21 0750)  Exam:   Physical Exam  Vitals signs reviewed  Constitutional:       General: She is not in acute distress  Appearance: She is not toxic-appearing  HENT:      Head: Normocephalic and atraumatic  Eyes:      General: No scleral icterus  Extraocular Movements: Extraocular movements intact  Neck:      Musculoskeletal: Normal range of motion  Cardiovascular:      Rate and Rhythm: Normal rate and regular rhythm  Heart sounds: No murmur  Pulmonary:      Effort: Pulmonary effort is normal  No respiratory distress  Breath sounds: Normal breath sounds  Abdominal:      General: There is no distension  Musculoskeletal: Normal range of motion  Skin:     General: Skin is warm  Neurological:      General: No focal deficit present  Mental Status: She is alert and oriented to person, place, and time  Psychiatric:         Mood and Affect: Mood normal          Behavior: Behavior normal          Thought Content: Thought content normal          Discussion with Family: patient    Discharge instructions/Information to patient and family:   See after visit summary for information provided to patient and family  Provisions for Follow-Up Care:  See after visit summary for information related to follow-up care and any pertinent home health orders  Disposition:     Home    For Discharges to [facility] SNF:   · Not Applicable to this Patient - Not Applicable to this Patient    Planned Readmission: no     Discharge Statement:  I spent 25 minutes discharging the patient  This time was spent on the day of discharge   I had direct contact with the patient on the day of discharge  Greater than 50% of the total time was spent examining patient, answering all patient questions, arranging and discussing plan of care with patient as well as directly providing post-discharge instructions  Additional time then spent on discharge activities  Discharge Medications:  See after visit summary for reconciled discharge medications provided to patient and family        ** Please Note: This note has been constructed using a voice recognition system **

## 2021-05-06 NOTE — ASSESSMENT & PLAN NOTE
· Unclear etiology, Alk phos, AST, ALT elevated    Unchanged today from labs yesterday   · CT a/p revealed hepatic steatosis

## 2021-05-06 NOTE — PLAN OF CARE
Problem: PAIN - ADULT  Goal: Verbalizes/displays adequate comfort level or baseline comfort level  Description: Interventions:  - Encourage patient to monitor pain and request assistance  - Assess pain using appropriate pain scale  - Administer analgesics based on type and severity of pain and evaluate response  - Implement non-pharmacological measures as appropriate and evaluate response  - Consider cultural and social influences on pain and pain management  - Notify physician/advanced practitioner if interventions unsuccessful or patient reports new pain  Outcome: Progressing     Problem: INFECTION - ADULT  Goal: Absence or prevention of progression during hospitalization  Description: INTERVENTIONS:  - Assess and monitor for signs and symptoms of infection  - Monitor lab/diagnostic results  - Monitor all insertion sites, i e  indwelling lines, tubes, and drains  - Monitor endotracheal if appropriate and nasal secretions for changes in amount and color  - [place] appropriate cooling/warming therapies per order  - Administer medications as ordered  - Instruct and encourage patient and family to use good hand hygiene technique  - Identify and instruct in appropriate isolation precautions for identified infection/condition  Outcome: Progressing  Goal: Absence of fever/infection during neutropenic period  Description: INTERVENTIONS:  - Monitor WBC    Outcome: Progressing     Problem: SAFETY ADULT  Goal: Patient will remain free of falls  Description: INTERVENTIONS:  - Assess patient frequently for physical needs  -  Identify cognitive and physical deficits and behaviors that affect risk of falls    -  [place] fall precautions as indicated by assessment   - Educate patient/family on patient safety including physical limitations  - Instruct patient to call for assistance with activity based on assessment  - Modify environment to reduce risk of injury  - Consider OT/PT consult to assist with strengthening/mobility  Outcome: Progressing  Goal: Maintain or return to baseline ADL function  Description: INTERVENTIONS:  -  Assess patient's ability to carry out ADLs; assess patient's baseline for ADL function and identify physical deficits which impact ability to perform ADLs (bathing, care of mouth/teeth, toileting, grooming, dressing, etc )  - Assess/evaluate cause of self-care deficits   - Assess range of motion  - Assess patient's mobility; develop plan if impaired  - Assess patient's need for assistive devices and provide as appropriate  - Encourage maximum independence but intervene and supervise when necessary  - Involve family in performance of ADLs  - Assess for home care needs following discharge   - Consider OT consult to assist with ADL evaluation and planning for discharge  - Provide patient education as appropriate  Outcome: Progressing  Goal: Maintain or return mobility status to optimal level  Description: INTERVENTIONS:  - Assess patient's baseline mobility status (ambulation, transfers, stairs, etc )    - Identify cognitive and physical deficits and behaviors that affect mobility  - Identify mobility aids required to assist with transfers and/or ambulation (gait belt, sit-to-stand, lift, walker, cane, etc )  - [place] fall precautions as indicated by assessment  - Record patient progress and toleration of activity level on Mobility SBAR; progress patient to next Phase/Stage  - Instruct patient to call for assistance with activity based on assessment  - Consider rehabilitation consult to assist with strengthening/weightbearing, etc   Outcome: Progressing     Problem: DISCHARGE PLANNING  Goal: Discharge to home or other facility with appropriate resources  Description: INTERVENTIONS:  - Identify barriers to discharge w/patient and caregiver  - Arrange for needed discharge resources and transportation as appropriate  - Identify discharge learning needs (meds, wound care, etc )  - Arrange for interpretive services to assist at discharge as needed  - Refer to Case Management Department for coordinating discharge planning if the patient needs post-hospital services based on physician/advanced practitioner order or complex needs related to functional status, cognitive ability, or social support system  Outcome: Progressing     Problem: Knowledge Deficit  Goal: Patient/family/caregiver demonstrates understanding of disease process, treatment plan, medications, and discharge instructions  Description: Complete learning assessment and assess knowledge base    Interventions:  - Provide teaching at level of understanding  - Provide teaching via preferred learning methods  Outcome: Progressing

## 2021-05-06 NOTE — RESPIRATORY THERAPY NOTE
RT Protocol Note  Jelani Guerrero 77 y o  female MRN: [id number]  Unit/Bed#: CW2 216-01 Encounter: [id number]    Assessment    Principal Problem:    Arrhythmia  Active Problems:    HTN (hypertension)    Sleep apnea    Hypothyroidism    COPD (chronic obstructive pulmonary disease) ([address] )    Palpitations    Former tobacco use    Pitting edema      Home Pulmonary Medications:  BREO/ ALbuterol PRN    Home Devices/Therapy: (P) BiPAP/CPAP    Past Medical History:   Diagnosis Date    Cataract     bilateral    Chronic pain disorder     low back pain-sees a chiro    CPAP (continuous positive airway pressure) dependence     Disease of thyroid gland     hypo    Hearing aid worn     bilateral    History of infection due to penicillin-resistant Streptococcus pneumoniae     TALC    Hypertension     Sleep apnea     Wears glasses     Wears partial dentures     upper     Social History     Socioeconomic History    Marital status: Single     Spouse name: None    Number of children: None    Years of education: None    Highest education level: None   Occupational History    None   Social Needs    Financial resource strain: None    Food insecurity     Worry: None     Inability: None    Transportation needs     Medical: None     Non-medical: None   Tobacco Use    Smoking status: Former Smoker     Quit date:      Years since quittin 3    Smokeless tobacco: Never Used   Substance and Sexual Activity    Alcohol use:  Yes     Alcohol/week: 7 0 standard drinks     Types: 7 Glasses of wine per week     Frequency: 4 or more times a week    Drug use: Never    Sexual activity: None   Lifestyle    Physical activity     Days per week: None     Minutes per session: None    Stress: None   Relationships    Social connections     Talks on phone: None     Gets together: None     Attends Mu-ism service: None     Active member of club or organization: None     Attends meetings of clubs or organizations: None Relationship status: None    Intimate partner violence     Fear of current or ex partner: None     Emotionally abused: None     Physically abused: None     Forced sexual activity: None   Other Topics Concern    None   Social History Narrative    None       Subjective         Objective    Physical Exam:   Assessment Type: (P) Assess only  General Appearance: (P) Awake, Alert  Respiratory Pattern: (P) Normal  Chest Assessment: (P) Chest expansion symmetrical  Bilateral Breath Sounds: (P) Diminished, Clear  O2 Device: (P) NC    Vitals:  Blood pressure 139/71, pulse 78, temperature 98 °F (36 7 °C), resp  rate 16, weight 88 3 kg (194 lb 10 7 oz), SpO2 91 %  Imaging and other studies: I have personally reviewed pertinent reports  O2 Device: (P) NC     Plan    Respiratory Plan: (P) Home Bronchodilator Patient pathway        Resp Comments: (P) Pt ordered on PRN txs so Protocol done at bedside  PT is not in any resp distress and is here for an arrhythmia  Pt has hx of COPD/COLIN  PT takes BREO at home daily and PRN MDI albuterol  No need for scheduled txs  WIll order PRN MDI while here along with place pt on CPAP  HS when ready

## 2021-05-06 NOTE — ASSESSMENT & PLAN NOTE
Secondary to arrhythmia  · Monitor on telemetry  · Echo reviewed   · EP following - see primary problem

## 2021-05-06 NOTE — PLAN OF CARE
Problem: INFECTION - ADULT  Goal: Absence or prevention of progression during hospitalization  Description: INTERVENTIONS:  - Assess and monitor for signs and symptoms of infection  - Monitor lab/diagnostic results  - Monitor all insertion sites, i e  indwelling lines, tubes, and drains  - Monitor endotracheal if appropriate and nasal secretions for changes in amount and color  - [place] appropriate cooling/warming therapies per order  - Administer medications as ordered  - Instruct and encourage patient and family to use good hand hygiene technique  - Identify and instruct in appropriate isolation precautions for identified infection/condition  Outcome: Progressing  Goal: Absence of fever/infection during neutropenic period  Description: INTERVENTIONS:  - Monitor WBC    Outcome: Progressing

## 2021-05-06 NOTE — DISCHARGE INSTRUCTIONS
Start taking Eliquis, metoprolol and flecainide  These medications are used to treat atrial fibrillation/flutter  Follow up with your primary care provider on discharge  A-fib (Atrial Fibrillation)   WHAT YOU NEED TO KNOW:   A-fib may come and go, or it may be a long-term condition  A-fib can cause blood clots, stroke, or heart failure  These conditions may become life-threatening  It is important to treat and manage A-fib to help prevent a blood clot, stroke, or heart failure  DISCHARGE INSTRUCTIONS:   Call your local emergency number (911 in the [address] [place] [address],[address]) or have someone call if:   · You have any of the following signs of a heart attack:      ? Squeezing, pressure, or pain in your chest    ? You may  also have any of the following:     § Discomfort or pain in your back, neck, jaw, stomach, or arm    § Shortness of breath    § Nausea or vomiting    § Lightheadedness or a sudden cold sweat    · You have any of the following signs of a stroke:      ? Numbness or drooping on one side of your face     ? Weakness in an arm or leg    ? Confusion or difficulty speaking    ? Dizziness, a severe headache, or vision loss    Call your doctor or cardiologist if:   · Your arm or leg feels warm, tender, and painful  It may look swollen and red  · Your heart rate is more than 110 beats per minute  · You have new or worsening swelling in your legs, feet, ankles, or abdomen  · You are short of breath, even at rest     · You have questions or concerns about your condition or care  Medicines: You may need any of the following:  · Heart medicines  help control your heart rate or rhythm  You may need more than one medicine to treat your symptoms  · Blood thinners  help prevent blood clots  Clots can cause strokes, heart attacks, and death  The following are general safety guidelines to follow while you are taking a blood thinner:    ? Watch for bleeding and bruising while you take blood thinners   Watch for bleeding from your gums or nose  Watch for blood in your urine and bowel movements  Use a soft washcloth on your skin, and a soft toothbrush to brush your teeth  This can keep your skin and gums from bleeding  If you shave, use an electric shaver  Do not play contact sports  ? Tell your dentist and other healthcare providers that you take a blood thinner  Wear a bracelet or necklace that says you take this medicine  ? Do not start or stop any other medicines unless your healthcare provider tells you to  Many medicines cannot be used with blood thinners  ? Take your blood thinner exactly as prescribed by your healthcare provider  Do not skip does or take less than prescribed  Tell your provider right away if you forget to take your blood thinner, or if you take too much  ? Warfarin  is a blood thinner that you may need to take  The following are things you should be aware of if you take warfarin:     § Foods and medicines can affect the amount of warfarin in your blood  Do not make major changes to your diet while you take warfarin  Warfarin works best when you eat about the same amount of vitamin K every day  Vitamin K is found in green leafy vegetables and certain other foods  Ask for more information about what to eat when you are taking warfarin  § You will need to see your healthcare provider for follow-up visits when you are on warfarin  You will need regular blood tests  These tests are used to decide how much medicine you need  · Antiplatelets , such as aspirin, help prevent blood clots  Take your antiplatelet medicine exactly as directed  These medicines make it more likely for you to bleed or bruise  If you are told to take aspirin, do not take acetaminophen or ibuprofen instead  · Take your medicine as directed  Contact your healthcare provider if you think your medicine is not helping or if you have side effects  Tell him or her if you are allergic to any medicine   Keep a list of the medicines, vitamins, and herbs you take  Include the amounts, and when and why you take them  Bring the list or the pill bottles to follow-up visits  Carry your medicine list with you in case of an emergency  Manage A-fib:   · Know your target heart rate  Learn how to check your pulse and monitor your heart rate  · Know the risks if you choose to drink alcohol  Alcohol can increase your risk for A-fib or make A-fib harder to manage  Ask your healthcare provider if it is okay for you to drink any alcohol  He or she can help you set limits for the number of drinks you have in 24 hours and in a week  A drink of alcohol is 12 ounces of beer, 5 ounces of wine, or 1½ ounces of liquor  · Do not smoke  Nicotine can cause heart damage and make it more difficult to manage your A-fib  Do not use e-cigarettes or smokeless tobacco in place of cigarettes or to help you quit  They still contain nicotine  Ask your healthcare provider for information if you currently smoke and need help quitting  · Eat heart-healthy foods  Heart healthy foods will help keep your cholesterol low  These include fruits, vegetables, whole-grain breads, low-fat dairy products, beans, lean meats, and fish  Replace butter and margarine with heart-healthy oils such as olive oil and canola oil  · Maintain a healthy weight  Ask your healthcare provider what a healthy weight is for you  Ask him or her to help you create a safe weight loss plan if you are overweight  Even a small goal of a 10% weight loss can improve your heart health  · Get regular physical activity  Physical activity helps improve your heart health  Get at least 150 minutes of moderate aerobic physical activity each week  Your healthcare provider can help you create an activity plan  · Manage other health conditions  This includes high blood pressure or cholesterol, sleep apnea, diabetes, and other heart conditions   Take medicine as directed and follow your treatment plan  Your healthcare provider may need to change a medicine you are taking if it is causing your A-fib  Do not  stop taking any medicine unless directed by your provider  Follow up with your doctor or cardiologist as directed: You will need regular blood tests and monitoring  Write down your questions so you remember to ask them during your visits  © Copyright [employer] 2021 Information is for End User's use only and may not be sold, redistributed or otherwise used for commercial purposes  All illustrations and images included in CareNotes® are the copyrighted property of [employer]  or [facility] Ernestine Peralta   The above information is an  only  It is not intended as medical advice for individual conditions or treatments  Talk to your doctor, nurse or pharmacist before following any medical regimen to see if it is safe and effective for you

## 2021-05-06 NOTE — PROGRESS NOTES
Progress Note - Electrophysiology  Sharad Paez 77 y o  female MRN: [id number]  Unit/Bed#: CW2 216-01 Encounter: [id number]      Assessment:  1  SVT felt to be atrial fibrillation/flutter - new onset  - started on Eliquis / [id number] score of 5 (age, sex, HTN, DVT)  - EF of 60% per echo 5/5/2021 / LA diameter of 3 78cm  - rate control: none prior to admission - being started on metoprolol succinate 25mg daily  - antiarrhythmic therapy: none prior to admission / being started on flecainide (prior stress test [facility] 3 years ago negative for ischemia)  2  Transaminitis on admission  3  Essential hypertension  - maintained on losartan 25mg daily as an outpatient  4  Hypothyroidism  5  COPD  6  Obstructive sleep apnea  - maintained on CPAP   7  Obesity with BMI of 38  8  History of DVT  9  Chronic back pain      Plan:  1  Patient has been maintaining sinus rhythm  She has undergone her nuclear stress test as we are starting flecainide  Patient is stable at this time for discharge  Would recommend she go home on 100 mg twice daily of flecainide and rate control medication of metoprolol succinate 25 mg daily  We did discuss how she will be able to continue losartan for hypertension control as well  She is agreeable to starting Eliquis for stroke prevention and [facility] after she is discharged  She was made aware of the cost of 47 dollars a month and is agreeable to this pain minute  Our office will call her to set up appointment with Dr Teresa Richardson in follow-up in about a month  She will also be called to set up a 2 week Zio patch early next week  Would prefer to see an external monitor short-term and in follow-up Dr Teresa Richardson longer term monitoring such as loop recorder, which her sister did have, can be pursued  She is stable from an EP standpoint for discharge at this time  2  Continue transaminitis can be worked up by her PCP as an outpatient    Treatment of UTI per primary team     Subjective/Objective   Subjective:  Patient reports doing well today and has had no further episodes SVT on monitor  She did get back from her nuclear stress test and had no issues with this as she had had in the past     TELE:       EKG:         Objective:  Vitals: /63 (BP Location: Right arm)   Pulse 67   Temp 97 8 °F (36 6 °C) (Oral)   Resp 18   Wt 88 3 kg (194 lb 10 7 oz)   SpO2 91%   BMI 38 34 kg/m²     Vitals:    05/05/21 0904   Weight: 88 3 kg (194 lb 10 7 oz)     Orthostatic Blood Pressures      Most Recent Value   Blood Pressure  116/63 filed at 05/06/2021 0750   Patient Position - Orthostatic VS  Lying filed at 05/06/2021 0750            Intake/Output Summary (Last 24 hours) at 5/6/2021 0834  Last data filed at 5/6/2021 0497  Gross per 24 hour   Intake 920 ml   Output 150 ml   Net 770 ml       Invasive Devices     Peripheral Intravenous Line            Peripheral IV 05/05/21 Left Antecubital less than 1 day                          Scheduled Meds:  Current Facility-Administered Medications   Medication Dose Route Frequency Provider Last Rate    albuterol  2 puff Inhalation Q4H PRN Kade Matos MD      apixaban  5 mg Oral BID Kathrin Hallman PA-C      cefTRIAXone  1,000 mg Intravenous Q24H Alba Mackenzie MD 1,000 mg (05/05/21 1526)    flecainide  100 mg Oral Q12H [address] Kathrin SEGUNDO Hallman      metoprolol succinate  25 mg Oral Daily Kathrin Hallman PA-C      saccharomyces boulardii  250 mg Oral BID Alba Mackenzie MD       Continuous Infusions:   PRN Meds:   albuterol    Review of Systems:  ROS as noted above, otherwise 12 point review of systems was performed and is negative       Physical Exam:   GEN: NAD, alert and oriented, well appearing  SKIN: dry without significant lesions or rashes  HEENT: NCAT, PERRL, EOMs intact  NECK: No JVD or carotid bruits appreciated  CARDIOVASCULAR: RRR, normal S1, S2 without murmurs, rubs, or gallops appreciated  LUNGS: Clear to auscultation bilaterally without wheezes, rhonchi, or rales  ABDOMEN: Soft, nontender, nondistended  EXTREMITIES/VASCULAR: perfused without clubbing, cyanosis, or edema b/l  PSYCH: Normal mood and affect  NEURO: CN ll-Xll grossly intact              Lab Results: I have personally reviewed pertinent lab results  Results from last 7 days   Lab Units 21  0451 21  0901   WBC Thousand/uL 10 17* 9 20   HEMOGLOBIN g/dL 14 1 16 0*   HEMATOCRIT % 43 4 48 5*   PLATELETS Thousands/uL 310 354     Results from last 7 days   Lab Units 21  0451 21  0901   POTASSIUM mmol/L 4 0 4 4   CHLORIDE mmol/L 112* 110*   CO2 mmol/L 22 24   BUN mg/dL 17 17   CREATININE mg/dL 0 87 0 89   CALCIUM mg/dL 9 0 9 9     Results from last 7 days   Lab Units 21  0901   INR  0 99   PTT seconds 33     Results from last 7 days   Lab Units 21  0451   MAGNESIUM mg/dL 2 1       Imaging: I have personally reviewed pertinent reports  Results for orders placed during the hospital encounter of 21   Echo complete with contrast if indicated    N[facility]ative [facility] [place] [address]  [facility] [place], [address] [facility]  [phone]    Transthoracic Echocardiogram  2D    Study date:  05-May-2021    Patient: Jocelyn Larson  MR number: [id number]  Account number: [de-identified]  : 1945  Age: 77 years  Gender: Female  Status: Outpatient  Location: Emergency room  Height: 60 in  Weight: 193 6 lb  BP: 148/ 68 mmHg    Indications: A-Fib    Diagnoses: I48 0 - Atrial fibrillation    Sonographer:  Severiano Santacruz MD  Primary Physician:  LAVONNE Ferreira  Referring Physician:  Severiano Santacruz MD  Group:  [facility]  Cardiology Fellow:  Bulmaro Andujar DO  Interpreting Physician:  Ashley Trejo MD    SUMMARY    LEFT VENTRICLE:  Systolic function was normal  Ejection fraction was estimated to be 60 %  There were no regional wall motion abnormalities      LEFT ATRIUM:  The atrium was mildly dilated  MITRAL VALVE:  There was trace regurgitation  TRICUSPID VALVE:  There was trace regurgitation  Estimated peak PA pressure was 38 mmHg  The findings suggest mild pulmonary hypertension  AORTA:  There was mild dilatation of the ascending aorta at 3 1 cm in diameter  HISTORY: PRIOR HISTORY: Hypertension; COPD; Palpitations; Edema; Sleep Apnea; Former Smoker    PROCEDURE: The procedure was performed in the emergency room  This was a routine study  The transthoracic approach was used  The study included complete 2D imaging  The heart rate was 79 bpm, at the start of the study  Images were obtained  from the parasternal, apical, subcostal, and suprasternal notch acoustic windows  Image quality was adequate  LEFT VENTRICLE: Size was normal  Systolic function was normal  Ejection fraction was estimated to be 60 %  There were no regional wall motion abnormalities  Wall thickness was normal  DOPPLER: Left ventricular diastolic function parameters  were normal     RIGHT VENTRICLE: The size was normal  Systolic function was normal  Wall thickness was normal     LEFT ATRIUM: The atrium was mildly dilated  RIGHT ATRIUM: Size was normal     MITRAL VALVE: Valve structure was normal  There was normal leaflet separation  DOPPLER: The transmitral velocity was within the normal range  There was no evidence for stenosis  There was trace regurgitation  AORTIC VALVE: The valve was trileaflet  Leaflets exhibited normal cuspal separation and sclerosis  DOPPLER: Transaortic velocity was within the normal range  There was no evidence for stenosis  There was no regurgitation  TRICUSPID VALVE: The valve structure was normal  There was normal leaflet separation  DOPPLER: The transtricuspid velocity was within the normal range  There was no evidence for stenosis  There was trace regurgitation  Estimated peak PA  pressure was 38 mmHg   The findings suggest mild pulmonary hypertension  PULMONIC VALVE: Leaflets exhibited normal thickness, no calcification, and normal cuspal separation  DOPPLER: The transpulmonic velocity was within the normal range  There was no regurgitation  PERICARDIUM: There was no pericardial effusion  The pericardium was normal in appearance  AORTA: The root exhibited normal size  There was mild dilatation of the ascending aorta at 3 1 cm in diameter  SYSTEMIC VEINS: IVC: The inferior vena cava was normal in size and course   Respirophasic changes were normal     SYSTEM MEASUREMENT TABLES    2D  %FS: 39 43 %  Ao Diam: 2 88 cm  Ao asc: 3 11 cm  EDV(Teich): 93 23 ml  EF(Teich): 70 07 %  ESV(Teich): 27 9 ml  IVSd: 0 7 cm  LA Area: 19 72 cm2  LA Diam: 3 78 cm  LVEDV MOD A4C: 49 92 ml  LVEF MOD A4C: 64 41 %  LVESV MOD A4C: 17 77 ml  LVIDd: 4 52 cm  LVIDs: 2 74 cm  LVLd A4C: 5 74 cm  LVLs A4C: 4 84 cm  LVPWd: 0 67 cm  RA Area: 16 08 cm2  RVIDd: 3 6 cm  SV MOD A4C: 32 16 ml  SV(Teich): 65 33 ml    CW  TR Vmax: 2 87 m/s  TR maxP 06 mmHg    MM  TAPSE: 1 87 cm    PW  E' Sept: 0 1 m/s  E/E' Sept: 9 51  MV A Tj: 0 56 m/s  MV Dec [place]: 5 69 m/s2  MV DecT: 165 2 ms  MV E Tj: 0 94 m/s  MV E/A Ratio: 1 69  MV PHT: 47 91 ms  MVA By PHT: 4 59 cm2    [employer] Accredited Echocardiography Laboratory    Prepared and electronically signed by    Jeanie Martinez MD  Signed 05-May-2021 17:51:11         VTE Pharmacologic Prophylaxis:  Xarelto  VTE Mechanical Prophylaxis: sequential compression device

## 2021-05-06 NOTE — ASSESSMENT & PLAN NOTE
On the right > left side  As per the daughter this is chronic  · Patient had last echocardiogram 3 years ago    Will repeat another  · Echo 5/5/21: EF 60%, no RWMA, trace regurgitation of mitral and tricuspid valves, mild pulmonary HTN

## 2021-05-06 NOTE — ASSESSMENT & PLAN NOTE
Discussed the original EKG with cardiac electrophysiology  · As per EP, most likely a fib/flutter   · [id number] score = 5    Started on Eliquis for [facility]  · EP recommending to start Toprol XL 25 mg daily and flecainide 100 mg BID  · Echo reviewed - EF 60%, no RWMA, pulmonary HTN   · Stress test result pending   · Outpt f/u with EP/cardiology

## 2021-05-07 LAB — BACTERIA UR CULT: ABNORMAL

## 2021-05-10 ENCOUNTER — CLINICAL SUPPORT (OUTPATIENT)
Dept: CARDIOLOGY CLINIC | Facility: CLINIC | Age: 76
End: 2021-05-10
Payer: MEDICARE

## 2021-05-10 DIAGNOSIS — R00.2 PALPITATIONS: ICD-10-CM

## 2021-05-10 LAB
BACTERIA BLD CULT: NORMAL
BACTERIA BLD CULT: NORMAL

## 2021-05-10 PROCEDURE — 93246 EXT ECG>7D<15D RECORDING: CPT | Performed by: PHYSICIAN ASSISTANT

## 2021-05-10 NOTE — PROGRESS NOTES
Elizabeth Deluna is here today under the direction of Eli Seals  Patch applied and all instructions for use given to patient in office

## 2021-05-11 ENCOUNTER — TELEPHONE (OUTPATIENT)
Dept: CARDIOLOGY CLINIC | Facility: CLINIC | Age: 76
End: 2021-05-11

## 2021-05-11 NOTE — TELEPHONE ENCOUNTER
I spoke to elaina campbell and she stated to have the patient restart her losartan 25mg daily and to cut her metoprolol to 12 5mg  Patient will keep track of her HR and update us   Thank you

## 2021-05-11 NOTE — TELEPHONE ENCOUNTER
Patient called today stating she has been extremely tired  Patient believes this is related to her start of the metoprolol  Patient also states she is SOB, dizziness, and lightheaded  Patient's blood pressure has been [id number] HR 54  Patient is concerned because her HR is low for her   Please advise, thank you

## 2021-05-17 NOTE — TELEPHONE ENCOUNTER
I called patient today to follow up, patient states her blood pressure and pulse is better and more stable but she has felt more fatigued along with lightheadedness and increased SOB  Patient is not sure if it is related to her flecainide  Patient is currently taking flecainide 100mg BID  Metoprolol succinate 12 5 daily and losartan   Please advise, thank you

## 2021-05-17 NOTE — TELEPHONE ENCOUNTER
Can we please call her and decrease her flecainide to 50 mg twice daily? There was some concern that if we take her flecainide completely she will go back into RVR

## 2021-05-25 ENCOUNTER — TELEPHONE (OUTPATIENT)
Dept: CARDIOLOGY CLINIC | Facility: CLINIC | Age: 76
End: 2021-05-25

## 2021-05-25 NOTE — TELEPHONE ENCOUNTER
Alessia Granado,    Can you please check and see if there are any visits that we can get the patient in with Dr Kimani Weiss sooner than 6/9? She is on the lowest dose of beta-blocker and the lowest dose of flecainide now but was referred by Dr Kimani Weiss not to take her off of the flecainide in case she goes back into atrial fibrillation with RVR  I think she needs to come in and discuss further with him if she is not feeling well      Thanks

## 2021-05-25 NOTE — TELEPHONE ENCOUNTER
Patient called today stating she is still fatigued and SOB  Patient is currently taking 50mg of flecainide BID  Patient did state she does not feel her heart palpations but is just exhausted   Please advise, thank you

## 2021-05-27 NOTE — TELEPHONE ENCOUNTER
Good Morning Lev Hurt 6/2/21 with Dr Anabel Ruby, waiting to hear back from the patient       Thank you,  Ming Braden

## 2021-06-02 ENCOUNTER — OFFICE VISIT (OUTPATIENT)
Dept: CARDIOLOGY CLINIC | Facility: CLINIC | Age: 76
End: 2021-06-02
Payer: MEDICARE

## 2021-06-02 VITALS
HEIGHT: 59 IN | HEART RATE: 66 BPM | BODY MASS INDEX: 38.71 KG/M2 | SYSTOLIC BLOOD PRESSURE: 142 MMHG | WEIGHT: 192 LBS | DIASTOLIC BLOOD PRESSURE: 80 MMHG

## 2021-06-02 DIAGNOSIS — E66.01 OBESITY, MORBID (HCC): ICD-10-CM

## 2021-06-02 DIAGNOSIS — I47.1 SVT (SUPRAVENTRICULAR TACHYCARDIA) (HCC): ICD-10-CM

## 2021-06-02 DIAGNOSIS — I48.0 PAROXYSMAL ATRIAL FIBRILLATION (HCC): ICD-10-CM

## 2021-06-02 DIAGNOSIS — R00.2 PALPITATIONS: Primary | ICD-10-CM

## 2021-06-02 PROCEDURE — 93000 ELECTROCARDIOGRAM COMPLETE: CPT | Performed by: INTERNAL MEDICINE

## 2021-06-02 PROCEDURE — 99215 OFFICE O/P EST HI 40 MIN: CPT | Performed by: INTERNAL MEDICINE

## 2021-06-02 RX ORDER — FLECAINIDE ACETATE 50 MG/1
50 TABLET ORAL EVERY 12 HOURS SCHEDULED
Qty: 180 TABLET | Refills: 3 | Status: SHIPPED | OUTPATIENT
Start: 2021-06-02

## 2021-06-04 ENCOUNTER — TELEPHONE (OUTPATIENT)
Dept: CARDIOLOGY CLINIC | Facility: CLINIC | Age: 76
End: 2021-06-04

## 2021-06-04 DIAGNOSIS — R00.2 PALPITATIONS: Primary | ICD-10-CM

## 2021-06-04 NOTE — TELEPHONE ENCOUNTER
Patient scheduled for Loop implant on 6/23/21 in [facility] with Dr Catrina Claude  Patient aware of all general instructions  Patient has Medicare as primary insurance

## 2021-06-16 ENCOUNTER — CLINICAL SUPPORT (OUTPATIENT)
Dept: CARDIOLOGY CLINIC | Facility: CLINIC | Age: 76
End: 2021-06-16
Payer: MEDICARE

## 2021-06-16 DIAGNOSIS — I48.92 ATRIAL FLUTTER, UNSPECIFIED TYPE (HCC): ICD-10-CM

## 2021-06-16 PROCEDURE — 93248 EXT ECG>7D<15D REV&INTERPJ: CPT | Performed by: INTERNAL MEDICINE

## 2021-06-21 DIAGNOSIS — I48.0 PAROXYSMAL ATRIAL FIBRILLATION (HCC): ICD-10-CM

## 2021-06-21 RX ORDER — METOPROLOL SUCCINATE 25 MG/1
12.5 TABLET, EXTENDED RELEASE ORAL DAILY
Qty: 45 TABLET | Refills: 2 | Status: SHIPPED | OUTPATIENT
Start: 2021-06-21

## 2021-06-22 NOTE — RESULT ENCOUNTER NOTE
Patient had a min HR of 44 bpm, max HR of 111 bpm, and avg HR of 57  bpm  Predominant underlying rhythm was Sinus Rhythm  2  Supraventricular Tachycardia runs occurred, the run with the fastest  interval lasting 7 beats with a max rate of 111 bpm (avg 102 bpm); the  run with the fastest interval was also the longest  Isolated SVEs were rare  (<1 0%), SVE Couplets were rare (<1 0%), and SVE Triplets were rare  (<1 0%)  Isolated VEs were rare (<1 0%), and no VE Couplets or VE  Triplets were present  Atrial fibrillation not seen  Still recommend loop monitor implantation  Please let her know  Thanks

## 2021-06-23 ENCOUNTER — HOSPITAL ENCOUNTER (OUTPATIENT)
Dept: NON INVASIVE DIAGNOSTICS | Facility: HOSPITAL | Age: 76
Discharge: HOME | End: 2021-06-23
Attending: INTERNAL MEDICINE | Admitting: INTERNAL MEDICINE
Payer: MEDICARE

## 2021-06-23 VITALS
WEIGHT: 194 LBS | TEMPERATURE: 97.8 F | HEIGHT: 60 IN | SYSTOLIC BLOOD PRESSURE: 158 MMHG | BODY MASS INDEX: 38.09 KG/M2 | DIASTOLIC BLOOD PRESSURE: 79 MMHG | RESPIRATION RATE: 18 BRPM | HEART RATE: 71 BPM

## 2021-06-23 DIAGNOSIS — R00.2 PALPITATIONS: ICD-10-CM

## 2021-06-23 PROCEDURE — 33285 INSJ SUBQ CAR RHYTHM MNTR: CPT

## 2021-06-23 PROCEDURE — C1764 EVENT RECORDER, CARDIAC: HCPCS

## 2021-06-23 PROCEDURE — 33285 INSJ SUBQ CAR RHYTHM MNTR: CPT | Performed by: PHYSICIAN ASSISTANT

## 2021-06-23 PROCEDURE — NC001 PR NO CHARGE: Performed by: PHYSICIAN ASSISTANT

## 2021-06-23 RX ORDER — LIDOCAINE HYDROCHLORIDE 10 MG/ML
INJECTION, SOLUTION EPIDURAL; INFILTRATION; INTRACAUDAL; PERINEURAL CODE/TRAUMA/SEDATION MEDICATION
Status: COMPLETED | OUTPATIENT
Start: 2021-06-23 | End: 2021-06-23

## 2021-06-23 RX ORDER — LIDOCAINE HYDROCHLORIDE 10 MG/ML
INJECTION, SOLUTION EPIDURAL; INFILTRATION; INTRACAUDAL; PERINEURAL
Status: DISCONTINUED
Start: 2021-06-23 | End: 2021-06-23 | Stop reason: HOSPADM

## 2021-06-23 RX ADMIN — LIDOCAINE HYDROCHLORIDE 15 ML: 10 INJECTION, SOLUTION EPIDURAL; INFILTRATION; INTRACAUDAL; PERINEURAL at 10:23

## 2021-06-23 NOTE — PROCEDURES
Placement of cardiac event recorder    History and physical were reviewed  Patient was examined and history was reviewed  No change in patient's condition Since history and physical has been completed        The pre- operative diagnosis:   1  Paroxysmal atrial fibrillation         Postoperative diagnosis:  1  Paroxysmal atrial fibrillation s/p ILR         Procedure: placement of cardiac event recorder          Surgeon: Anjel Todd PA-C    Assistants -none    Specimens - none    Estimated blood loss- none    Findings-none    Complications none    Anesthesia-  local lidocaine 15cc by myself      Details of the device [employer] Reveal Linq        Description of procedure: The patient was seen before the procedure  The details of the procedure was explained and patient agreed to the same  Appropriate consent was signed  Proper time out was done  Sterile dressing and draping was done  Local lidocaine was infiltrated, in the left third intercostal space, about 2 cm lateral to the sternal edge  Using the stab knife an incision was made  Thereafter the  was used, moved around to form a pocket, along the long axis of the heart, in the third intercostal space region  Then plunger was used to deploy the device  The plunger was disengaged and removed  The  was pulled back  Pressure was held and hemostasis was obtained  Pocket closed with interrupted 4-0 ethilon    Dressing was done with water resistant band aid       Summary of the procedure:  Linq implanted successfully and patient tolerated the procedure well

## 2021-06-23 NOTE — H&P
H&P Exam - Cardiology   Abby Juarez 77 y o  female MRN: [id number]  Unit/Bed#: P3 PROCEDURE 1 Encounter: [id number]    Assessment/Plan :  1  Paroxysmal atrial fibrillation  a  Loop implant today      History of Present Illness   HPI:  Abby Juarez is a 77y o  year old female with a history as stated above who presents today to undergo loop implantation secondary to PAF  Review of Systems   Constitutional: Negative for chills and fever  Respiratory: Negative for chest tightness and shortness of breath  Cardiovascular: Negative for chest pain  Neurological: Negative for dizziness and syncope  All other systems reviewed and are negative  Historical Information   Past Medical History:   Diagnosis Date    Cataract     bilateral    Chronic pain disorder     low back pain-sees a chiro    CPAP (continuous positive airway pressure) dependence     Disease of thyroid gland     hypo    Hearing aid worn     bilateral    History of infection due to penicillin-resistant Streptococcus pneumoniae 2005    [facility]    Hypertension     Sleep apnea     Wears glasses     Wears partial dentures     upper       Past Surgical History:   Procedure Laterality Date    CERVICAL SPINE SURGERY      x3    CHOLECYSTECTOMY      Lap    COLONOSCOPY      FRACTURE SURGERY      ORIF right elbow    HYSTERECTOMY      complete    ND XCAPSL CTRC RMVL INSJ IO LENS PROSTH W/O ECP Left 11/30/2020    Procedure: EXTRACTION EXTRACAPSULAR CATARACT PHACO INTRAOCULAR LENS (IOL); Surgeon: Volodymyr Matthews MD;  Location: [facility] MAIN OR;  Service: Ophthalmology    ND XCAPSL CTRC RMVL INSJ IO LENS PROSTH W/O ECP Right 1/18/2021    Procedure: EXTRACTION EXTRACAPSULAR CATARACT PHACO INTRAOCULAR LENS (IOL);   Surgeon: Volodymyr Matthews MD;  Location: [facility] MAIN OR;  Service: Ophthalmology    THORACOSCOPY W/ [facility] PLEURODESIS      TONSILLECTOMY      TRIGGER FINGER RELEASE      thumb- release    WISDOM TOOTH EXTRACTION Family History   Problem Relation Age of Onset    Diabetes Mother     Heart disease Mother         MI    Thyroid disease Mother         hypo    Cancer Father         prostate    Heart disease Father         aortic valve disease       Social History   Social History     Substance and Sexual Activity   Alcohol Use Yes    Alcohol/week: 7 0 standard drinks    Types: 7 Glasses of wine per week     Social History     Substance and Sexual Activity   Drug Use Never     Social History     Tobacco Use   Smoking Status Former Smoker    Quit date:     Years since quittin 4   Smokeless Tobacco Never Used         Meds/Allergies   all medications and allergies reviewed  Home Medications:   Medications Prior to Admission   Medication    apixaban (ELIQUIS) 5 mg    cetirizine (ZyrTEC) 10 mg tablet    Cholecalciferol (Vitamin D) 50 MCG (2000) tablet    flecainide (TAMBOCOR) 50 mg tablet    Levothyroxine Sodium 125 MCG CAPS    metoprolol succinate (TOPROL-XL) 25 mg 24 hr tablet    Multiple Vitamins-Minerals (CENTRUM SILVER PO)    Probiotic Product (PROBIOTIC DAILY PO)    Calcium 600 MG tablet    Denosumab (PROLIA SC)    fluticasone-vilanterol (Breo Ellipta) 100-25 mcg/inh inhaler    prednisoLONE acetate (PRED FORTE) 1 % ophthalmic suspension       No Known Allergies  Objective    Vitals: Blood pressure 158/79, pulse 71, temperature 97 8 °F (36 6 °C), resp  rate 18, height 5' (1 524 m), weight 88 kg (194 lb)  Orthostatic Blood Pressures      Most Recent Value   Blood Pressure  158/79 filed at 2021 1010          No intake or output data in the 24 hours ending 21 1013    Invasive Devices     None                 Physical Exam  Vitals reviewed  Constitutional:       General: She is not in acute distress  Appearance: She is not ill-appearing or diaphoretic  HENT:      Head: Normocephalic and atraumatic        Right Ear: External ear normal       Left Ear: External ear normal  Nose: Nose normal    Eyes:      General:         Right eye: No discharge  Left eye: No discharge  Cardiovascular:      Rate and Rhythm: Normal rate and regular rhythm  Heart sounds: No murmur heard  No friction rub  Pulmonary:      Effort: Pulmonary effort is normal       Breath sounds: Normal breath sounds  No wheezing, rhonchi or rales  Abdominal:      General: There is no distension  Palpations: Abdomen is soft  Tenderness: There is no abdominal tenderness  Musculoskeletal:         General: No deformity or signs of injury  Cervical back: No rigidity  No muscular tenderness  Right lower leg: No edema  Left lower leg: No edema  Skin:     General: Skin is warm and dry  Capillary Refill: Capillary refill takes less than 2 seconds  Coloration: Skin is not jaundiced or pale  Neurological:      General: No focal deficit present  Mental Status: She is alert and oriented to person, place, and time  Mental status is at baseline  Psychiatric:         Mood and Affect: Mood normal          Behavior: Behavior normal          Thought Content:  Thought content normal              Code Status: Prior

## 2021-06-23 NOTE — DISCHARGE INSTRUCTIONS
- Keep loop recorder incision dry for one week  Do not use lotions, powders, creams, or ointments on incision      - Remove outer bandage 24-48 hours after procedure  There is waterproof glue present over the incision  This should keep the incision dry  Avoid picking at the glue or peeling it off  The glue will come off in its own time      - Because the glue is waterproof, it is safe to shower if the glue remains on over the incision  It is ok to let the soap and water gently run over the incision  Avoid direct exposure to the stream of water  Do not soak the incision  Do not scrub  Pat dry  - If the glue comes off in less that 7 days, place a waterproof bandage over the incision before showering     - If present, leave underlying steri-strips in place  They will either fall off on their own or will be removed at the 2 week follow up appointment  If present, leave stitches in place  They will be removed at the 2 week follow up appointment  - Please call the office if you notice redness, swelling, bleeding, or drainage from incision or if you develop fevers  Cardiac Loop Recorder Insertion      WHAT YOU SHOULD KNOW:    A cardiac loop recorder is a device used to diagnose heart rhythm problems, such as a fast or irregular heartbeat  It is implanted in your left chest, just under the skin  The device records a pattern of your heart's rhythm, called an EKG  Your device records automatic EKGs, depending on how your caregiver programs it  You may also receive a handheld controller  You press a button on the controller when you have symptoms, such as dizziness, lightheadedness, or palpitations  The device will record an EKG at that moment  The recording can help your caregiver see if your symptoms may be caused by heart rhythm problems  Your caregiver will remove the device after it has collected enough data  You may need the device for up to 3 years   The procedure to remove the device is similar to the procedure used to implant it  AFTER YOU LEAVE:    Follow up with your cardiologist as directed: You will need to present to the office in 2 weeks  A nurse at the device clinic will check your incision and remove any stitches or steri strips that may be present  They may also program your device settings again  They will retrieve data from the device every 1 to 3 months with a monitor held over your skin  You may be able to transmit data from your device from home as well  You will do this by calling a number provided by the device clinic or as they have instructed you  Ask for information about this process  Write down your questions so you remember to ask them during your visits  Wound care: Keep loop recorder incision dry for one week  Do not use lotions, powders, creams, or ointments on incision  Remove outer bandage 24-48 hours after procedure  There is waterproof glue present over the incision  This should keep the incision dry  Avoid picking at the glue or peeling it off  The glue will come off in its own time  Because the glue is waterproof, it is safe to shower if the glue remains on over the incision  It is ok to let the soap and water gently run over the incision  Avoid direct exposure to the stream of water  Do not soak the incision  Do not scrub  Pat dry  If the glue comes off in less that 7 days, place a waterproof bandage over the incision before showering  If present, leave underlying steri-strips in place  They will either fall off on their own or will be removed at the 2 week follow up appointment  If present, leave stitches in place  They will be removed at the 2 week follow up appointment  Please call the office if you notice redness, swelling, bleeding, or drainage from incision or if you develop fevers  Return to activity: If you received anesthesia, you will not be able to drive for 24 hours  Otherwise, most people can return to normal activities soon after the procedure   Your cardiologist may want to know if your work involves electrical current or high-voltage equipment  Ask about other electrical items that could interfere with your cardiac loop recorder  Contact your cardiologist if:   · You have a fever or chills  · Your wound is red, swollen, or draining pus  · You have questions or concerns about your condition or care  Seek care immediately or call 911 if:   · You feel weak, dizzy, or faint  · You lose consciousness  © 2014 [employer] is for End User's use only and may not be sold, redistributed or otherwise used for commercial purposes  All illustrations and images included in CareNotes® are the copyrighted property of [employer]  or [employer] Pr[employer]  The above information is an  only  It is not intended as medical advice for individual conditions or treatments  Talk to your doctor, nurse or pharmacist before following any medical regimen to see if it is safe and effective for you

## 2021-06-25 ENCOUNTER — TELEPHONE (OUTPATIENT)
Dept: CARDIOLOGY CLINIC | Facility: CLINIC | Age: 76
End: 2021-06-25

## 2021-06-25 NOTE — TELEPHONE ENCOUNTER
----- Message from Alka Ho MD sent at 6/22/2021  3:32 PM EDT -----  Patient had a min HR of 44 bpm, max HR of 111 bpm, and avg HR of 57  bpm  Predominant underlying rhythm was Sinus Rhythm  2  Supraventricular Tachycardia runs occurred, the run with the fastest  interval lasting 7 beats with a max rate of 111 bpm (avg 102 bpm); the  run with the fastest interval was also the longest  Isolated SVEs were rare  (<1 0%), SVE Couplets were rare (<1 0%), and SVE Triplets were rare  (<1 0%)  Isolated VEs were rare (<1 0%), and no VE Couplets or VE  Triplets were present  Atrial fibrillation not seen  Still recommend loop monitor implantation  Please let her know  Thanks

## 2021-07-09 ENCOUNTER — IN-CLINIC DEVICE VISIT (OUTPATIENT)
Dept: CARDIOLOGY CLINIC | Facility: CLINIC | Age: 76
End: 2021-07-09

## 2021-07-09 DIAGNOSIS — Z95.818 PRESENCE OF OTHER CARDIAC IMPLANTS AND GRAFTS: Primary | ICD-10-CM

## 2021-07-09 PROCEDURE — 99024 POSTOP FOLLOW-UP VISIT: CPT | Performed by: INTERNAL MEDICINE

## 2021-07-09 NOTE — PROGRESS NOTES
Results for orders placed or performed in visit on 07/09/21   Cardiac EP device report    Narrative    MD[facility] LN[facility] INCISION CLEAN AND DRY WITH EDGES APPROXIMATED; SUTURES REMOVED; WOUND CARE AND RESTRICTIONS REVIEWED WITH PATIENT  RF CAME IN TO ASSESS SITE PT RQST  PIC ATTACHED  DEVICE INTERROGATED IN THE [facility] OFFICE: BATTERY VOLTAGE ADEQUATE  NO PATIENT OR DEVICE ACTIVATED EPISODES  NORMAL DEVICE FUNCTION   NC         Current Outpatient Medications:     apixaban (ELIQUIS) 5 mg, Take 1 tablet (5 mg total) by mouth 2 (two) times a day, Disp: 60 tablet, Rfl: 3    Calcium 600 MG tablet, Take by mouth daily at bedtime, Disp: , Rfl:     cetirizine (ZyrTEC) 10 mg tablet, Take 10 mg by mouth every morning, Disp: , Rfl:     Cholecalciferol (Vitamin D) 50 MCG (2000 UT) tablet, Take by mouth 2 (two) times a day, Disp: , Rfl:     Denosumab (PROLIA SC), Inject under the skin 2 x a yr, Disp: , Rfl:     flecainide (TAMBOCOR) 50 mg tablet, Take 1 tablet (50 mg total) by mouth every 12 (twelve) hours, Disp: 180 tablet, Rfl: 3    fluticasone-vilanterol (Breo Ellipta) 100-25 mcg/inh inhaler, every morning, Disp: , Rfl:     Levothyroxine Sodium 125 MCG CAPS, every morning , Disp: , Rfl:     metoprolol succinate (TOPROL-XL) 25 mg 24 hr tablet, Take 0 5 tablets (12 5 mg total) by mouth daily, Disp: 45 tablet, Rfl: 2    Multiple Vitamins-Minerals (CENTRUM SILVER PO), Take by mouth every morning, Disp: , Rfl:     prednisoLONE acetate (PRED FORTE) 1 % ophthalmic suspension, 4 (four) times a day Post op, Disp: , Rfl:     Probiotic Product (PROBIOTIC DAILY PO), Take by mouth every morning, Disp: , Rfl:

## 2021-09-16 ENCOUNTER — OFFICE VISIT (OUTPATIENT)
Dept: CARDIOLOGY CLINIC | Facility: CLINIC | Age: 76
End: 2021-09-16
Payer: MEDICARE

## 2021-09-16 VITALS
SYSTOLIC BLOOD PRESSURE: 128 MMHG | DIASTOLIC BLOOD PRESSURE: 72 MMHG | WEIGHT: 189.1 LBS | BODY MASS INDEX: 37.12 KG/M2 | HEART RATE: 62 BPM | HEIGHT: 60 IN

## 2021-09-16 DIAGNOSIS — R00.2 PALPITATIONS: Primary | ICD-10-CM

## 2021-09-16 PROCEDURE — 99215 OFFICE O/P EST HI 40 MIN: CPT | Performed by: INTERNAL MEDICINE

## 2021-09-16 PROCEDURE — 93000 ELECTROCARDIOGRAM COMPLETE: CPT | Performed by: INTERNAL MEDICINE

## 2021-09-16 RX ORDER — LOSARTAN POTASSIUM 25 MG/1
TABLET ORAL
COMMUNITY
Start: 2021-07-06

## 2021-09-16 NOTE — PROGRESS NOTES
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, hypothyroidism, COPD, sleep apnea on CPAP, H/O DVT who felt fatigue for several week  She noted palpitation two nights ago while sitting  She noted rhythm was irregular and fast  This occurred after she had her routine alcohol glass  She tried to sleep through it but it became faster and regular in the morning  She presented to ED where it was noted to be NCT - possibly 2:1 AT/AFL  She converted to sinus rhythm spontaneously  She denies feeling similar palpitations in the past       She likely had atrial fibrillation and AT as she has multiple risk factors to develop them  We started her on metoprolol XL 25 mg daily and flecainide 100 mg bid  She had negative stress test on May 6, 2021  Alternative AAD were discussed particularly sotalol but she does not want to stay 2 nights in the hospital       She was started on Eliquis 5 mg bid  She did have elevated LFT, unclear etiology possibly from DHF or alcohol  Hepatic steatosis was seen on CT abdomen/pelvis  Gallbladder was absent  She then presents for a follow-up visit  She had to reduce flecainide to 50 mg twice daily as it was causing her significant fatigue  She notes that dose has worked well for her  She also noted that metoprolol 12 5 mg has also worked well rather than 25 mg  She otherwise denied any palpitation and dizziness  We discussed loop monitor placement as she wanted to discontinue Eliquis  She had the loop monitor implanted on 6/23/2021  She has not had any episodes  She notes having back pain and is unable to take analgesics due to interactions with Eliquis  She also has significant bruising         She denies any chest pain,  shortness of breath, dyspnea on exertion, orthopnea, PND or syncope  Patient Active Problem List   Diagnosis    HTN (hypertension)    Sleep apnea    Hypothyroidism    COPD (chronic obstructive pulmonary disease) ([facility] )    Palpitations    Former tobacco use    Arrhythmia    Pitting edema    Transaminitis    Obesity, morbid ([facility] )     Past Medical History:   Diagnosis Date    Cataract     bilateral    Chronic pain disorder     low back pain-sees a chiro    CPAP (continuous positive airway pressure) dependence     Disease of thyroid gland     hypo    Hearing aid worn     bilateral    History of infection due to penicillin-resistant Streptococcus pneumoniae     [facility]    Hypertension     Sleep apnea     Wears glasses     Wears partial dentures     upper     Social History     Socioeconomic History    Marital status: Single     Spouse name: Not on file    Number of children: Not on file    Years of education: Not on file    Highest education level: Not on file   Occupational History    Not on file   Tobacco Use    Smoking status: Former Smoker     Quit date:      Years since quittin 7    Smokeless tobacco: Never Used   Substance and Sexual Activity    Alcohol use: Yes     Alcohol/week: 7 0 standard drinks     Types: 7 Glasses of wine per week    Drug use: Never    Sexual activity: Not on file   Other Topics Concern    Not on file   Social History Narrative    Not on file     Social Determinants of Health     Financial Resource Strain:     Difficulty of Paying Living Expenses:    Food Insecurity:     Worried About Running Out of Food in the Last Year:     [address] in the Last Year:    Transportation Needs:     Lack of Transportation (Medical):      Lack of Transportation (Non-Medical):    Physical Activity:     Days of Exercise per Week:     Minutes of Exercise per Session:    Stress:     Feeling of Stress :    Social Connections:     Frequency of Communication with Friends and Family:     Frequency of Social Gatherings with Friends and Family:     Attends Yazidi Services:     Active Member of Clubs or Organizations:     Attends Club or Organization Meetings:     Marital Status:    Intimate Partner Violence:     Fear of Current or Ex-Partner:     Emotionally Abused:     Physically Abused:     Sexually Abused:       Family History   Problem Relation Age of Onset    Diabetes Mother     Heart disease Mother         MI    Thyroid disease Mother         hypo    Cancer Father         prostate    Heart disease Father         aortic valve disease     Past Surgical History:   Procedure Laterality Date    CERVICAL SPINE SURGERY      x3    CHOLECYSTECTOMY      Lap    COLONOSCOPY      FRACTURE SURGERY      ORIF right elbow    HYSTERECTOMY      complete    SC XCAPSL CTRC RMVL INSJ IO LENS PROSTH W/O ECP Left 11/30/2020    Procedure: EXTRACTION EXTRACAPSULAR CATARACT PHACO INTRAOCULAR LENS (IOL); Surgeon: Elizabeth Hernandez MD;  Location: [facility] OR;  Service: Ophthalmology    SC XCAPSL CTRC RMVL INSJ IO LENS PROSTH W/O ECP Right 1/18/2021    Procedure: EXTRACTION EXTRACAPSULAR CATARACT PHACO INTRAOCULAR LENS (IOL);   Surgeon: Elizabeth Hernandez MD;  Location: [facility] OR;  Service: Ophthalmology    THORACOSCOPY W/ TALC PLEURODESIS      TONSILLECTOMY      TRIGGER FINGER RELEASE      thumb- release    WISDOM TOOTH EXTRACTION         Current Outpatient Medications:     apixaban (ELIQUIS) 5 mg, Take 1 tablet (5 mg total) by mouth 2 (two) times a day, Disp: 60 tablet, Rfl: 3    Calcium 600 MG tablet, Take by mouth daily at bedtime, Disp: , Rfl:     cetirizine (ZyrTEC) 10 mg tablet, Take 10 mg by mouth every morning, Disp: , Rfl:     Cholecalciferol (Vitamin D) 50 MCG (2000 UT) tablet, Take by mouth 2 (two) times a day, Disp: , Rfl:     Denosumab (PROLIA SC), Inject under the skin 2 x a yr, Disp: , Rfl:     flecainide (TAMBOCOR) 50 mg tablet, Take 1 tablet (50 mg total) by mouth every 12 (twelve) hours, Disp: 180 tablet, Rfl: 3    fluticasone-vilanterol (Breo Ellipta) 100-25 mcg/inh inhaler, every morning, Disp: , Rfl:     Levothyroxine Sodium 125 MCG CAPS, every morning , Disp: , Rfl:     losartan (COZAAR) 25 mg tablet, TAKE 1 TABLET BY MOUTH  DAILY, Disp: , Rfl:     metoprolol succinate (TOPROL-XL) 25 mg 24 hr tablet, Take 0 5 tablets (12 5 mg total) by mouth daily, Disp: 45 tablet, Rfl: 2    Multiple Vitamins-Minerals (CENTRUM SILVER PO), Take by mouth every morning, Disp: , Rfl:     Probiotic Product (PROBIOTIC DAILY PO), Take by mouth every morning, Disp: , Rfl:     prednisoLONE acetate (PRED FORTE) 1 % ophthalmic suspension, 4 (four) times a day Post op, Disp: , Rfl:   No Known Allergies    Labs:  Lab Results   Component Value Date    K 4 0 05/06/2021    K 4 4 05/05/2021    CO2 22 05/06/2021    CO2 24 05/05/2021    BUN 17 05/06/2021    BUN 17 05/05/2021    CREATININE 0 87 05/06/2021    CREATININE 0 89 05/05/2021    CALCIUM 9 0 05/06/2021    CALCIUM 9 9 05/05/2021     Lab Results   Component Value Date    TROPONINI <0 02 05/05/2021     Lab Results   Component Value Date    WBC 10 17 (H) 05/06/2021    WBC 9 20 05/05/2021    HGB 14 1 05/06/2021    HGB 16 0 (H) 05/05/2021    HCT 43 4 05/06/2021    HCT 48 5 (H) 05/05/2021     (H) 05/06/2021     (H) 05/05/2021     05/06/2021     05/05/2021     No results found for: CHOL, TRIG, HDL, LDLDIRECT  Imaging: Xr Chest 1 View Portable    Result Date: 5/5/2021  Narrative: CHEST INDICATION:   tachycardia, lightheadedness  COMPARISON:  Abdomen CT from 5/5/2021  EXAM PERFORMED/VIEWS:  XR CHEST PORTABLE FINDINGS: Cardiomediastinal silhouette appears unremarkable  The lungs are clear  No pneumothorax or pleural effusion  Old right rib fractures  Cervicothoracic fusion  Impression: No acute cardiopulmonary disease   Workstation performed: [id number]     Ct Abdomen Pelvis With Contrast    Result Date: 5/5/2021  Narrative: CT ABDOMEN AND PELVIS WITH IV CONTRAST INDICATION:   SVT, with transaminitis of unknown origin  COMPARISON:  None  TECHNIQUE:  CT examination of the abdomen and pelvis was performed  Axial, sagittal, and coronal 2D reformatted images were created from the source data and submitted for interpretation  Radiation dose length product (DLP) for this visit:  968 69 mGy-cm   This examination, like all CT scans performed in the [facility], was performed utilizing techniques to minimize radiation dose exposure, including the use of iterative  reconstruction and automated exposure control  IV Contrast:  100 mL of iohexol (OMNIPAQUE) Enteric Contrast:  Enteric contrast was not administered  FINDINGS: ABDOMEN LOWER CHEST:  No acute consolidation Suggestion of emphysema LIVER/BILIARY TREE:  Hepatic steatosis seen There is no hypervascular lesion seen GALLBLADDER:  , Surgically absent Common bile duct measures 8 mm SPLEEN:  Unremarkable  PANCREAS:  Unremarkable  ADRENAL GLANDS:  Unremarkable  KIDNEYS/URETERS:  No hydronephrosis or urinary tract calculus  One or more sharply circumscribed subcentimeter renal hypodensities are present, too small to accurately characterize, and statistically most likely benign findings  According to recent literature (Radiology 2019) no further workup of these findings is recommended  STOMACH AND BOWEL:  Unremarkable  APPENDIX:  No findings to suggest appendicitis  ABDOMINOPELVIC CAVITY:  No ascites  No pneumoperitoneum  No lymphadenopathy  Or portal caval lymph nodes are seen measuring about 5 to 6 mm VESSELS:  Unremarkable for patient's age  PELVIS REPRODUCTIVE ORGANS:  The uterus is surgically absent URINARY BLADDER:  There is mild thickening of the urinary bladder wall noted along its left lateral aspect ABDOMINAL WALL/INGUINAL REGIONS:  Unremarkable   OSSEOUS STRUCTURES:  No acute compression collapse of the vertebra No gross lytic lesion Degenerative changes seen within the lumbar spine     Impression: Hepatic steatosis No biliary dilation seen No hypervascular lesion in the liver Workstation performed: [id number]       Review of Systems:  Review of Systems   Constitutional: Negative for chills, fatigue and fever  Eyes: Negative for discharge and visual disturbance  Respiratory: Negative  Negative for shortness of breath and wheezing  Cardiovascular: Negative for chest pain, palpitations and leg swelling  Gastrointestinal: Negative for abdominal pain, nausea and vomiting  Endocrine: Negative  Genitourinary: Negative  Musculoskeletal: Negative  Negative for arthralgias  Skin: Negative  Negative for rash  Allergic/Immunologic: Negative  Neurological: Negative for dizziness and light-headedness  Psychiatric/Behavioral: Negative  The patient is not nervous/anxious  Physical Exam:  /72 (BP Location: Left arm, Patient Position: Sitting, Cuff Size: Adult)   Pulse 62   Ht 5' (1 524 m)   Wt 85 8 kg (189 lb 1 6 oz)   BMI 36 93 kg/m²    Physical Exam  Constitutional:       Appearance: Normal appearance  Comments:   Morbidly obese   HENT:      Head: Normocephalic and atraumatic  Mouth/Throat:      Mouth: Mucous membranes are moist       Pharynx: No oropharyngeal exudate  Eyes:      General: No scleral icterus  Extraocular Movements: Extraocular movements intact  Neck:      Musculoskeletal: Normal range of motion and neck supple  Cardiovascular:      Rate and Rhythm: Normal rate and regular rhythm  Pulses: Normal pulses  Heart sounds: Normal heart sounds  No murmur  No friction rub  No gallop  Pulmonary:      Effort: Pulmonary effort is normal       Breath sounds: Normal breath sounds  Abdominal:      General: Abdomen is flat  There is no distension  Palpations: Abdomen is soft  Musculoskeletal: Normal range of motion  General: No swelling     Skin: General: Skin is warm and dry  +easy bruising  Neurological:      General: No focal deficit present  Mental Status: She is alert and oriented to person, place, and time  Psychiatric:         Mood and Affect: Mood normal          Behavior: Behavior normal          Thought Content: Thought content normal          Labs & Results:  Below is the patient's most recent value for Albumin, ALT, AST, BUN, Calcium, Chloride, Cholesterol, CO2, Creatinine, GFR, Glucose, HDL, Hematocrit, Hemoglobin, Hemoglobin A1C, LDL, Magnesium, Phosphorus, Platelets, Potassium, PSA, Sodium, Triglycerides, and WBC  Lab Results   Component Value Date     (H) 2021     (H) 2021    BUN 17 2021    CALCIUM 9 0 2021     (H) 2021    CO2 22 2021    CREATININE 0 87 2021    HCT 43 4 2021    HGB 14 1 2021    HGBA1C 5 6 2021    MG 2 1 2021     2021    K 4 0 2021    WBC 10 17 (H) 2021     Note: for a comprehensive list of the patient's lab results, access the Results Review activity  Cardiac testing:     I personally reviewed the ECG performed in the clinic on 21  It reveals  Sinus rhythm at 62 beats per minute      Echocardiograms:  Results for orders placed during the hospital encounter of 21   Echo complete with contrast if indicated    N[facility]ative [facility]  [address]  [phone]    Transthoracic Echocardiogram  2D    Study date:  05-May-2021    Patient: Fabiola Jimenez  MR number: [id number]  Account number: [de-identified]  : 1945  Age: 77 years  Gender: Female  Status: Outpatient  Location: Emergency room  Height: 60 in  Weight: 193 6 lb  BP: 148/ 68 mmHg    Indications: A-Fib    Diagnoses: I48 0 - Atrial fibrillation    Sonographer:  Byron Conner MD  Primary Physician:  LAVONNE Kruger  Referring Physician:  Byron Conner MD  Group:  [facility]  Cardiology Fellow:  Evelyn Castaneda DO  Interpreting Physician:  Barbara Valle MD    SUMMARY    LEFT VENTRICLE:  Systolic function was normal  Ejection fraction was estimated to be 60 %  There were no regional wall motion abnormalities  LEFT ATRIUM:  The atrium was mildly dilated  MITRAL VALVE:  There was trace regurgitation  TRICUSPID VALVE:  There was trace regurgitation  Estimated peak PA pressure was 38 mmHg  The findings suggest mild pulmonary hypertension  AORTA:  There was mild dilatation of the ascending aorta at 3 1 cm in diameter  HISTORY: PRIOR HISTORY: Hypertension; COPD; Palpitations; Edema; Sleep Apnea; Former Smoker    PROCEDURE: The procedure was performed in the emergency room  This was a routine study  The transthoracic approach was used  The study included complete 2D imaging  The heart rate was 79 bpm, at the start of the study  Images were obtained  from the parasternal, apical, subcostal, and suprasternal notch acoustic windows  Image quality was adequate  LEFT VENTRICLE: Size was normal  Systolic function was normal  Ejection fraction was estimated to be 60 %  There were no regional wall motion abnormalities  Wall thickness was normal  DOPPLER: Left ventricular diastolic function parameters  were normal     RIGHT VENTRICLE: The size was normal  Systolic function was normal  Wall thickness was normal     LEFT ATRIUM: The atrium was mildly dilated  RIGHT ATRIUM: Size was normal     MITRAL VALVE: Valve structure was normal  There was normal leaflet separation  DOPPLER: The transmitral velocity was within the normal range  There was no evidence for stenosis  There was trace regurgitation  AORTIC VALVE: The valve was trileaflet  Leaflets exhibited normal cuspal separation and sclerosis  DOPPLER: Transaortic velocity was within the normal range  There was no evidence for stenosis   There was no regurgitation  TRICUSPID VALVE: The valve structure was normal  There was normal leaflet separation  DOPPLER: The transtricuspid velocity was within the normal range  There was no evidence for stenosis  There was trace regurgitation  Estimated peak PA  pressure was 38 mmHg  The findings suggest mild pulmonary hypertension  PULMONIC VALVE: Leaflets exhibited normal thickness, no calcification, and normal cuspal separation  DOPPLER: The transpulmonic velocity was within the normal range  There was no regurgitation  PERICARDIUM: There was no pericardial effusion  The pericardium was normal in appearance  AORTA: The root exhibited normal size  There was mild dilatation of the ascending aorta at 3 1 cm in diameter  SYSTEMIC VEINS: IVC: The inferior vena cava was normal in size and course  Respirophasic changes were normal     SYSTEM MEASUREMENT TABLES    2D  %FS: 39 43 %  Ao Diam: 2 88 cm  Ao asc: 3 11 cm  EDV(Teich): 93 23 ml  EF(Teich): 70 07 %  ESV(Teich): 27 9 ml  IVSd: 0 7 cm  LA Area: 19 72 cm2  LA Diam: 3 78 cm  LVEDV MOD A4C: 49 92 ml  LVEF MOD A4C: 64 41 %  LVESV MOD A4C: 17 77 ml  LVIDd: 4 52 cm  LVIDs: 2 74 cm  LVLd A4C: 5 74 cm  LVLs A4C: 4 84 cm  LVPWd: 0 67 cm  RA Area: 16 08 cm2  RVIDd: 3 6 cm  SV MOD A4C: 32 16 ml  SV(Teich): 65 33 ml    CW  TR Vmax: 2 87 m/s  TR maxP 06 mmHg    MM  TAPSE: 1 87 cm    PW  E' Sept: 0 1 m/s  E/E' Sept: 9 51  MV A Tj: 0 56 m/s  MV Dec Kern: 5 69 m/s2  MV DecT: 165 2 ms  MV E Tj: 0 94 m/s  MV E/A Ratio: 1 69  MV PHT: 47 91 ms  MVA By PHT: 4 59 cm2    [facility] Accredited Echocardiography Laboratory    Prepared and electronically signed by    Filipe Arguello MD  Signed [id number] 17:51:11       No results found for this or any previous visit  Catheterizations:   No results found for this or any previous visit  Stress Tests:  No results found for this or any previous visit      Holter monitor -   No results found for this or any previous visit  No results found for this or any previous visit  Discussion/Summary:  1  Atrial fibrillation/ flutter   · Patient denies having further palpitations since hospital discharge   · She reports compliance with flecainide 50 mg b i d  and Toprol XL 12 5 by mg daily  · She is also currently on Eliquis 5 mg twice daily but due to balance issues she would like to discontinue it   · She understands she may be at risk of having stroke given her chads2 vasc score of 4  · We discuss further options to manage atrial arrhythmias including ablation therapy versus monitoring with a loop recorder   · We also discussed Watchman procedure  · She opted to proceed with loop recorder implantation   · S/p loop implantation on 6/23/2021  · No episodes of atrial fibrillation  · Will discontinue flecainide but continue Eliquis  · If has atrial fibrillation episodes then will resume flecainide;   · Check device in one month  · If no episodes, she can stop Eliquis and start aspirin     2  Hypothyroid   · Currently maintained on levothyroxine   · Last TSH was 2 5 in May 2021    3  Sleep apnea  · She reports compliance with CPAP     4   DVT  · Se reports DVT occurred in the setting of postop and therefore was provoked    5  morbid obesity  · Patient is aware of her weight and attempting diet and exercise

## 2021-09-16 NOTE — PATIENT INSTRUCTIONS
Stop flecainide     Continue Eliquis and aspirin    We'll check your loop recorder in one month on your routine remote check next months

## 2021-09-29 DIAGNOSIS — I47.1 SVT (SUPRAVENTRICULAR TACHYCARDIA) (HCC): ICD-10-CM

## 2021-09-29 RX ORDER — APIXABAN 5 MG/1
TABLET, FILM COATED ORAL
Qty: 60 TABLET | Refills: 3 | Status: SHIPPED | OUTPATIENT
Start: 2021-09-29

## 2021-10-14 ENCOUNTER — REMOTE DEVICE CLINIC VISIT (OUTPATIENT)
Dept: CARDIOLOGY CLINIC | Facility: CLINIC | Age: 76
End: 2021-10-14
Payer: MEDICARE

## 2021-10-14 DIAGNOSIS — Z95.818 PRESENCE OF OTHER CARDIAC IMPLANTS AND GRAFTS: Primary | ICD-10-CM

## 2021-10-14 PROCEDURE — G2066 INTER DEVC REMOTE 30D: HCPCS | Performed by: INTERNAL MEDICINE

## 2021-10-14 PROCEDURE — 93298 REM INTERROG DEV EVAL SCRMS: CPT | Performed by: INTERNAL MEDICINE

## 2021-10-15 ENCOUNTER — TELEPHONE (OUTPATIENT)
Dept: CARDIOLOGY CLINIC | Facility: CLINIC | Age: 76
End: 2021-10-15

## 2022-06-02 NOTE — PROGRESS NOTES
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, hypothyroidism, COPD, sleep apnea on CPAP, H/O DVT who felt fatigue for several week  She noted palpitation two nights ago while sitting  She noted rhythm was irregular and fast  This occurred after she had her routine alcohol glass  She tried to sleep through it but it became faster and regular in the morning  She presented to ED where it was noted to be NCT - possibly 2:1 AT/AFL  She converted to sinus rhythm spontaneously  She denies feeling similar palpitations in the past       She likely had atrial fibrillation and AT as she has multiple risk factors to develop them  We started her on metoprolol XL 25 mg daily and flecainide 100 mg bid  She had negative stress test on May 6, 2021  Alternative AAD were discussed particularly sotalol but she does not want to stay 2 nights in the hospital       She was started on Eliquis 5 mg bid  She did have elevated LFT, unclear etiology possibly from DHF or alcohol  Hepatic steatosis was seen on CT abdomen/pelvis  Gallbladder was absent  She now presents for a follow-up visit  She had to reduce flecainide to 50 mg twice daily as it was causing her significant fatigue  She notes that dose has worked well for her  She also notes that metoprolol 12 5 mg has also worked well rather than 25 mg  She otherwise denies any palpitation and dizziness  She does report having a balance issue that may result in a fall in the future and request to come off Eliquis    She denies any chest pain,  shortness of breath, dyspnea on exertion, orthopnea, PND or syncope  Patient Active Problem List   Diagnosis    HTN (hypertension)    Sleep apnea    Hypothyroidism    COPD (chronic obstructive pulmonary disease) ([facility] )    Palpitations    Former tobacco use    Arrhythmia    Pitting edema    Transaminitis    Obesity, morbid ([facility] )     Past Medical History:   Diagnosis Date    Cataract     bilateral    Chronic pain disorder     low back pain-sees a chiro    CPAP (continuous positive airway pressure) dependence     Disease of thyroid gland     hypo    Hearing aid worn     bilateral    [facility] of [facility] due to [facility]-resistant Streptococcus pneumoniae     [facility]    Hypertension     Sleep apnea     Wears glasses     Wears partial dentures     upper     Social History     Socioeconomic History    Marital status: Single     Spouse name: Not on file    Number of children: Not on file    Years of education: Not on file    Highest education level: Not on file   Occupational History    Not on file   Social Needs    Financial resource strain: Not on file    Food insecurity     Worry: Not on file     Inability: Not on file    Transportation needs     Medical: Not on file     Non-medical: Not on file   Tobacco Use    Smoking status: Former Smoker     Quit date:      Years since quittin 4    Smokeless tobacco: Never Used   Substance and Sexual Activity    Alcohol use:  Yes     Alcohol/week: 7 0 standard drinks     Types: 7 Glasses of wine per week     Frequency: 4 or more times a week    Drug use: Never    Sexual activity: Not on file   Lifestyle    Physical activity     Days per week: Not on file     Minutes per session: Not on file    Stress: Not on file   Relationships    Social connections     Talks on phone: Not on file     Gets together: Not on file     Attends Zoroastrianism service: Not on file     Active member of club or organization: Not on file     Attends meetings of clubs or organizations: Not on file     Relationship status: Not on file    Intimate partner violence     Fear of current or ex partner: Not on file     Emotionally abused: Not on file     Physically abused: Not on file     Forced sexual activity: Not on file   Other Topics Concern    Not on file   Social History Narrative    Not on file      Family History   Problem Relation Age of Onset    Diabetes Mother     Heart disease Mother         MI    Thyroid disease Mother         hypo    Cancer Father         prostate    Heart disease Father         aortic valve disease     Past Surgical History:   Procedure Laterality Date    CERVICAL SPINE SURGERY      x3    CHOLECYSTECTOMY      Lap    COLONOSCOPY      FRACTURE SURGERY      ORIF right elbow    HYSTERECTOMY      complete    DC XCAPSL CTRC RMVL INSJ IO LENS PROSTH W/O ECP Left 11/30/2020    Procedure: EXTRACTION EXTRACAPSULAR CATARACT PHACO INTRAOCULAR LENS (IOL); Surgeon: Kristel Black MD;  Location: [facility] MAIN OR;  Service: Ophthalmology    DC XCAPSL CTRC RMVL INSJ IO LENS PROSTH W/O ECP Right 1/18/2021    Procedure: EXTRACTION EXTRACAPSULAR CATARACT PHACO INTRAOCULAR LENS (IOL);   Surgeon: Kristel Black MD;  Location: [facility] MAIN OR;  Service: Ophthalmology    THORACOSCOPY W/ TALC PLEURODESIS      TONSILLECTOMY      TRIGGER FINGER RELEASE      thumb- release    WISDOM TOOTH EXTRACTION         Current Outpatient Medications:     apixaban (ELIQUIS) 5 mg, Take 1 tablet (5 mg total) by mouth 2 (two) times a day, Disp: 60 tablet, Rfl: 3    Calcium 600 MG tablet, Take by mouth daily at bedtime, Disp: , Rfl:     cetirizine (ZyrTEC) 10 mg tablet, Take 10 mg by mouth every morning, Disp: , Rfl:     Cholecalciferol (Vitamin D) 50 MCG (2000 UT) tablet, Take by mouth 2 (two) times a day, Disp: , Rfl:     Denosumab (PROLIA SC), Inject under the skin 2 x a yr, Disp: , Rfl:     flecainide (TAMBOCOR) 50 mg tablet, Take 1 tablet (50 mg total) by mouth every 12 (twelve) hours, Disp: 180 tablet, Rfl: 3    fluticasone-vilanterol (Breo Ellipta) 100-25 mcg/inh inhaler, every morning, Disp: , Rfl:     Levothyroxine Sodium 125 MCG CAPS, every morning , Disp: , Rfl:     metoprolol succinate (TOPROL-XL) 25 mg 24 hr tablet, Take 1 tablet (25 mg total) by mouth daily (Patient taking differently: Take 12 5 mg by mouth daily ), Disp: 30 tablet, Rfl: 0    Multiple Vitamins-Minerals (CENTRUM SILVER PO), Take by mouth every morning, Disp: , Rfl:     Probiotic Product (PROBIOTIC DAILY PO), Take by mouth every morning, Disp: , Rfl:     prednisoLONE acetate (PRED FORTE) 1 % ophthalmic suspension, 4 (four) times a day Post op, Disp: , Rfl:   No Known Allergies    Labs:  Lab Results   Component Value Date    K 4 0 05/06/2021    K 4 4 05/05/2021    CO2 22 05/06/2021    CO2 24 05/05/2021    BUN 17 05/06/2021    BUN 17 05/05/2021    CREATININE 0 87 05/06/2021    CREATININE 0 89 05/05/2021    CALCIUM 9 0 05/06/2021    CALCIUM 9 9 05/05/2021     Lab Results   Component Value Date    TROPONINI <0 02 05/05/2021     Lab Results   Component Value Date    WBC 10 17 (H) 05/06/2021    WBC 9 20 05/05/2021    HGB 14 1 05/06/2021    HGB 16 0 (H) 05/05/2021    HCT 43 4 05/06/2021    HCT 48 5 (H) 05/05/2021     (H) 05/06/2021     (H) 05/05/2021     05/06/2021     05/05/2021     No results found for: CHOL, TRIG, HDL, LDLDIRECT  Imaging: Xr Chest 1 View Portable    Result Date: 5/5/2021  Narrative: CHEST INDICATION:   tachycardia, lightheadedness  COMPARISON:  Abdomen CT from 5/5/2021  EXAM PERFORMED/VIEWS:  XR CHEST PORTABLE FINDINGS: Cardiomediastinal silhouette appears unremarkable  The lungs are clear  No pneumothorax or pleural effusion  Old right rib fractures  Cervicothoracic fusion  Impression: No acute cardiopulmonary disease   Workstation performed: [id number]     Ct Abdomen Pelvis With Contrast    Result Date: 5/5/2021  Narrative: CT ABDOMEN AND PELVIS WITH IV CONTRAST INDICATION:   SVT, with transaminitis of unknown origin  COMPARISON:  None  TECHNIQUE:  CT examination of the abdomen and pelvis was performed  Axial, sagittal, and coronal 2D reformatted images were created from the source data and submitted for interpretation  Radiation dose length product (DLP) for this visit:  968 69 mGy-cm   This examination, like all CT scans performed in the [facility], was performed utilizing techniques to minimize radiation dose exposure, including the use of iterative  reconstruction and automated exposure control  IV Contrast:  100 mL of iohexol (OMNIPAQUE) Enteric Contrast:  Enteric contrast was not administered  FINDINGS: ABDOMEN LOWER CHEST:  No acute consolidation Suggestion of emphysema LIVER/BILIARY TREE:  Hepatic steatosis seen There is no hypervascular lesion seen GALLBLADDER:  , Surgically absent Common bile duct measures 8 mm SPLEEN:  Unremarkable  PANCREAS:  Unremarkable  ADRENAL GLANDS:  Unremarkable  KIDNEYS/URETERS:  No hydronephrosis or urinary tract calculus  One or more sharply circumscribed subcentimeter renal hypodensities are present, too small to accurately characterize, and statistically most likely benign findings  According to recent literature (Radiology 2019) no further workup of these findings is recommended  STOMACH AND BOWEL:  Unremarkable  APPENDIX:  No findings to suggest appendicitis  ABDOMINOPELVIC CAVITY:  No ascites  No pneumoperitoneum  No lymphadenopathy  Or portal caval lymph nodes are seen measuring about 5 to 6 mm VESSELS:  Unremarkable for patient's age  PELVIS REPRODUCTIVE ORGANS:  The uterus is surgically absent URINARY BLADDER:  There is mild thickening of the urinary bladder wall noted along its left lateral aspect ABDOMINAL WALL/INGUINAL REGIONS:  Unremarkable   OSSEOUS STRUCTURES:  No acute compression collapse of the vertebra No gross lytic lesion Degenerative changes seen within the lumbar spine Impression: Hepatic steatosis No biliary dilation seen No hypervascular lesion in the liver Workstation performed: [id number]       Review of Systems:  Review of Systems   Constitutional: Negative for chills, fatigue and fever  Eyes: Negative for discharge and visual disturbance  Respiratory: Negative  Negative for shortness of breath and wheezing  Cardiovascular: Negative for chest pain, palpitations and leg swelling  Gastrointestinal: Negative for abdominal pain, nausea and vomiting  Endocrine: Negative  Genitourinary: Negative  Musculoskeletal: Negative  Negative for arthralgias  Skin: Negative  Negative for rash  Allergic/Immunologic: Negative  Neurological: Negative for dizziness and light-headedness  Psychiatric/Behavioral: Negative  The patient is not nervous/anxious  Physical Exam:  /80 (BP Location: Left arm, Patient Position: Sitting, Cuff Size: Large)   Pulse 66   Ht 4' 11" (1 499 m)   Wt 87 1 kg (192 lb)   BMI 38 78 kg/m²    Physical Exam  Constitutional:       Appearance: Normal appearance  Comments:   Morbidly obese   HENT:      Head: Normocephalic and atraumatic  Mouth/Throat:      Mouth: Mucous membranes are moist       Pharynx: No oropharyngeal exudate  Eyes:      General: No scleral icterus  Extraocular Movements: Extraocular movements intact  Neck:      Musculoskeletal: Normal range of motion and neck supple  Cardiovascular:      Rate and Rhythm: Normal rate and regular rhythm  Pulses: Normal pulses  Heart sounds: Normal heart sounds  No murmur  No friction rub  No gallop  Pulmonary:      Effort: Pulmonary effort is normal       Breath sounds: Normal breath sounds  Abdominal:      General: Abdomen is flat  There is no distension  Palpations: Abdomen is soft  Musculoskeletal: Normal range of motion  General: No swelling  Skin:     General: Skin is warm and dry     Neurological:      General: No focal deficit present  Mental Status: She is alert and oriented to person, place, and time  Psychiatric:         Mood and Affect: Mood normal          Behavior: Behavior normal          Thought Content: Thought content normal          Labs & Results:  Below is the patient's most recent value for Albumin, ALT, AST, BUN, Calcium, Chloride, Cholesterol, CO2, Creatinine, GFR, Glucose, HDL, Hematocrit, Hemoglobin, Hemoglobin A1C, LDL, Magnesium, Phosphorus, Platelets, Potassium, PSA, Sodium, Triglycerides, and WBC  Lab Results   Component Value Date     (H) 2021     (H) 2021    BUN 17 2021    CALCIUM 9 0 2021     (H) 2021    CO2 22 2021    CREATININE 0 87 2021    HCT 43 4 2021    HGB 14 1 2021    HGBA1C 5 6 2021    MG 2 1 2021     2021    K 4 0 2021    WBC 10 17 (H) 2021     Note: for a comprehensive list of the patient's lab results, access the Results Review activity  Cardiac testing:     I personally reviewed the ECG performed in the clinic on 21  It reveals  Sinus rhythm at 66 beats per minute      Echocardiograms:  Results for orders placed during the hospital encounter of 21   Echo complete with contrast if indicated    [facility]  [address]  [phone]    Transthoracic Echocardiogram  2D    Study date:  05-May-2021    Patient: Tarah Valadez  MR number: [id number]  Account number: [de-identified]  : 1945  Age: 77 years  Gender: Female  Status: Outpatient  Location: Emergency room  Height: 60 in  Weight: 193 6 lb  BP: 148/ 68 mmHg    Indications: A-Fib    Diagnoses: I48 0 - Atrial fibrillation    Sonographer:  Gabi Gerber MD  Primary Physician:  LAVONNE Plasencia  Referring Physician:  Gabi Gerber MD  Group:  [facility]  Cardiology Fellow:  Vito Rust DO  Interpreting Physician:  Cathie Coppola MD    SUMMARY    LEFT VENTRICLE:  Systolic function was normal  Ejection fraction was estimated to be 60 %  There were no regional wall motion abnormalities  LEFT ATRIUM:  The atrium was mildly dilated  MITRAL VALVE:  There was trace regurgitation  TRICUSPID VALVE:  There was trace regurgitation  Estimated peak PA pressure was 38 mmHg  The findings suggest mild pulmonary hypertension  AORTA:  There was mild dilatation of the ascending aorta at 3 1 cm in diameter  HISTORY: PRIOR HISTORY: Hypertension; COPD; Palpitations; Edema; Sleep Apnea; Former Smoker    PROCEDURE: The procedure was performed in the emergency room  This was a routine study  The transthoracic approach was used  The study included complete 2D imaging  The heart rate was 79 bpm, at the start of the study  Images were obtained  from the parasternal, apical, subcostal, and suprasternal notch acoustic windows  Image quality was adequate  LEFT VENTRICLE: Size was normal  Systolic function was normal  Ejection fraction was estimated to be 60 %  There were no regional wall motion abnormalities  Wall thickness was normal  DOPPLER: Left ventricular diastolic function parameters  were normal     RIGHT VENTRICLE: The size was normal  Systolic function was normal  Wall thickness was normal     LEFT ATRIUM: The atrium was mildly dilated  RIGHT ATRIUM: Size was normal     MITRAL VALVE: Valve structure was normal  There was normal leaflet separation  DOPPLER: The transmitral velocity was within the normal range  There was no evidence for stenosis  There was trace regurgitation  AORTIC VALVE: The valve was trileaflet  Leaflets exhibited normal cuspal separation and sclerosis  DOPPLER: Transaortic velocity was within the normal range  There was no evidence for stenosis  There was no regurgitation  TRICUSPID VALVE: The valve structure was normal  There was normal leaflet separation   DOPPLER: The transtricuspid velocity was within the normal range  There was no evidence for stenosis  There was trace regurgitation  Estimated peak PA  pressure was 38 mmHg  The findings suggest mild pulmonary hypertension  PULMONIC VALVE: Leaflets exhibited normal thickness, no calcification, and normal cuspal separation  DOPPLER: The transpulmonic velocity was within the normal range  There was no regurgitation  PERICARDIUM: There was no pericardial effusion  The pericardium was normal in appearance  AORTA: The root exhibited normal size  There was mild dilatation of the ascending aorta at 3 1 cm in diameter  SYSTEMIC VEINS: IVC: The inferior vena cava was normal in size and course  Respirophasic changes were normal     SYSTEM MEASUREMENT TABLES    2D  %FS: 39 43 %  Ao Diam: 2 88 cm  Ao asc: 3 11 cm  EDV(Teich): 93 23 ml  EF(Teich): 70 07 %  ESV(Teich): 27 9 ml  IVSd: 0 7 cm  LA Area: 19 72 cm2  LA Diam: 3 78 cm  LVEDV MOD A4C: 49 92 ml  LVEF MOD A4C: 64 41 %  LVESV MOD A4C: 17 77 ml  LVIDd: 4 52 cm  LVIDs: 2 74 cm  LVLd A4C: 5 74 cm  LVLs A4C: 4 84 cm  LVPWd: 0 67 cm  RA Area: 16 08 cm2  RVIDd: 3 6 cm  SV MOD A4C: 32 16 ml  SV(Teich): 65 33 ml    CW  TR Vmax: 2 87 m/s  TR maxP 06 mmHg    MM  TAPSE: 1 87 cm    PW  E' Sept: 0 1 m/s  E/E' Sept: 9 51  MV A Tj: 0 56 m/s  MV Dec [place]: 5 69 m/s2  MV DecT: 165 2 ms  MV E Tj: 0 94 m/s  MV E/A Ratio: 1 69  MV PHT: 47 91 ms  MVA By PHT: 4 59 cm2    [employer] Accredited Echocardiography Laboratory    Prepared and electronically signed by    Sonia Cantu MD  Signed [id number] 17:51:11       No results found for this or any previous visit  Catheterizations:   No results found for this or any previous visit  Stress Tests:  No results found for this or any previous visit  Holter monitor -   No results found for this or any previous visit  No results found for this or any previous visit  Discussion/Summary:  1   Atrial fibrillation/ flutter   · Patient denies having further palpitations since hospital discharge   · She reports compliance with flecainide 50 mg b i d  and Toprol XL 12 5 by mg daily  · She is also currently on Eliquis 5 mg twice daily but due to balance issues she would like to discontinue it   · She understands she may be at risk of having stroke given her chads2 vasc score of 4  · We discuss further options to manage atrial arrhythmias including ablation therapy versus monitoring with a loop recorder   · We also discussed Watchman procedure  · She opted to proceed with loop recorder implantation   · We will have our  set of date and time for the loop implantation  · Continue Eliquis for now    2  Hypothyroid   · Currently maintained on levothyroxine   · Last TSH was 2 5 in May 2021    3  Sleep apnea  · He reports compliance with CPAP     4   DVT  · He reports DVT occurred in the setting of postop and therefore was provoked    5  morbid obesity  · Patient is aware of her weight and attempting diet and exercise symmetrical

## 2025-01-03 NOTE — H&P
[facility]  H&P- Viviana Noble 1945, 77 y o  female MRN: [id number]  Unit/Bed#: ED 08 Encounter: [id number]  Primary Care Provider: LAVONNE Martinez   Date and time admitted to hospital: 5/5/2021  8:38 AM         [facility] - History and Physical:       Viviana Noble 77 y o  female MRN: [id number]  Unit/Bed#: ED 08 Encounter: [id number]  Admitting Physician: Gm Ramirez MD  PCP: LAVONNE Martinez  Date of Admission:  05/05/21        Assessment and Plan:     * Arrhythmia  Assessment & Plan  Discussed the original EKG with cardiac electrophysiology  · As per EP could be SVT versus atrial flutter  She did sound irregular on physical exam  · Will start the patient on aspirin 81 mg plus Toprol   · Will monitor in telemetry  · Will consult Cardiac EP    Palpitations  Assessment & Plan  Secondary to arrhythmia  · Monitor in telemetry  · 2D echocardiogram will be ordered  · Cardiology consultation requested    Pitting edema  Assessment & Plan  On the right > left side  As per the daughter this is chronic  · Patient had last echocardiogram 3 years ago  Will repeat another    Sleep apnea  Assessment & Plan  · Continue CPAP  The patient stated that she has been compliant with the CPAP  · She is already established with a pulmonologist and last saw him 3 weeks ago    Former tobacco use  Assessment & Plan  · As per the patient    COPD (chronic obstructive pulmonary disease) ([facility] [address] )  Assessment & Plan  · Will order neb treatments as needed for shortness of breath  · Continue Breo Ellipta    Hypothyroidism  Assessment & Plan  · Continue levothyroxine    The patient stated that she now takes 125 mcg  · TSH is within normal limits    HTN (hypertension)  Assessment & Plan  · Continue losartan            VTE Prophylaxis: Enoxaparin (Lovenox)  / sequential compression device   Code Status: full  Discussion with family:  Daughter present at bedside    Anticipated Length of Stay:  Patient will be admitted on an Observation basis with an anticipated length of stay of  2 midnights  Justification for Hospital Stay:  Arrhythmia    Total Time for Visit, including Counseling / Coordination of Care: 30 minutes  Greater than 50% of this total time spent on direct patient counseling and coordination of care  Chief Complaint:   Palpitations    History of Present Illness:    Norberto Wilson is a 77 y o  female who presented to the ED for  episodes of palpitations which started last night around 10:00 p m  She stated that when she took her pulse last night, it felt irregular and significantly elevated  She also had associated lightheadedness and she had near syncopal symptoms  She felt as if she was going to pass out  In the ED the initial EKG showed a ventricular rate of 142 but then the arrhythmia broke on its own and the second EKG showed normal sinus rhythm  The patient stated that she had a similar episode before  At the moment the patient is asymptomatic  She denies any dizziness/lightheadedness as well as chest pain no shortness of breath  Apparently her pulse ox was low at some point but she does have a history of COPD  In addition, the patient has history of multiple UTIs in the past since January and has been treated with Macrobid and then Keflex  She stated that lately she did have symptoms of dysuria and she knew that she was having another urinary tract infection and was about to call her PCP to have Cipro prescribed  She denied any fevers/chills  Review of Systems:    Review of Systems   Constitutional: Positive for fatigue  HENT: Negative  Eyes: Negative  Respiratory: Negative  Cardiovascular: Positive for palpitations  Gastrointestinal: Negative  Genitourinary: Positive for dysuria  Musculoskeletal: Negative  Skin: Negative  Neurological: Positive for light-headedness  Negative for weakness  Psychiatric/Behavioral: Negative  Past Medical and Surgical History:     Past Medical History:   Diagnosis Date    Cataract     bilateral    Chronic pain disorder     low back pain-sees a chiro    CPAP (continuous positive airway pressure) dependence     Disease of thyroid gland     hypo    Hearing aid worn     bilateral    History of infection due to penicillin-resistant Streptococcus pneumoniae 2005    [facility]    Hypertension     Sleep apnea     Wears glasses     Wears partial dentures     upper       Past Surgical History:   Procedure Laterality Date    CERVICAL SPINE SURGERY      x3    CHOLECYSTECTOMY      Lap    COLONOSCOPY      FRACTURE SURGERY      ORIF right elbow    HYSTERECTOMY      complete    MO XCAPSL CTRC RMVL INSJ IO LENS PROSTH W/O ECP Left 11/30/2020    Procedure: EXTRACTION EXTRACAPSULAR CATARACT PHACO INTRAOCULAR LENS (IOL); Surgeon: Madelaine Rodriguez MD;  Location: [facility] MAIN OR;  Service: Ophthalmology    MO XCAPSL CTRC RMVL INSJ IO LENS PROSTH W/O ECP Right 1/18/2021    Procedure: EXTRACTION EXTRACAPSULAR CATARACT PHACO INTRAOCULAR LENS (IOL); Surgeon: Madelaine Rodriguez MD;  Location: [facility] MAIN OR;  Service: Ophthalmology    THORACOSCOPY W/ TALC PLEURODESIS      TONSILLECTOMY      TRIGGER FINGER RELEASE      thumb- release    WISDOM TOOTH EXTRACTION         Meds/Allergies:    Prior to Admission medications    Medication Sig Start Date End Date Taking?  Authorizing Provider   Calcium 600 MG tablet Take by mouth daily at bedtime    Historical Provider, MD   cetirizine (ZyrTEC) 10 mg tablet Take 10 mg by mouth every morning    Historical Provider, MD   Cholecalciferol (Vitamin D) 50 MCG (2000 UT) tablet Take by mouth 2 (two) times a day    Historical Provider, MD   Denosumab (PROLIA SC) Inject under the skin 2 x a yr    Historical Provider, MD   fluticasone-vilanterol [place] Ellipta) 100-25 mcg/inh inhaler every morning 9/4/20   Historical Provider, MD   gatifloxacin (ZYMAXID) 0 5 % Administer 1 drop to the right eye 2 (two) times a day 21   Kristin Contreras MD   Ibuprofen-diphenhydrAMINE Cit (ADVIL PM PO) Take by mouth as needed    Historical Provider, MD   ketorolac (ACULAR) 0 5 % ophthalmic solution Administer 1 drop to the right eye 4 (four) times a day 21   Kristin Contreras MD   levothyroxine 112 mcg tablet every morning 20   Historical Provider, MD   losartan (COZAAR) 25 mg tablet daily at bedtime 20   Historical Provider, MD   meloxicam (MOBIC) 7 5 mg tablet Take 7 5 mg by mouth as needed     Historical Provider, MD   Multiple Vitamins-Minerals (CENTRUM SILVER PO) Take by mouth every morning    Historical Provider, MD   naproxen sodium (ALEVE) 220 MG tablet Take 220 mg by mouth every 12 (twelve) hours as needed    Historical Provider, MD   pravastatin (PRAVACHOL) 20 mg tablet daily at bedtime 20   Historical Provider, MD   prednisoLONE acetate (PRED FORTE) 1 % ophthalmic suspension 4 (four) times a day Post op 20   Historical Provider, MD   Probiotic Product (PROBIOTIC DAILY PO) Take by mouth every morning    Historical Provider, MD     I have reviewed home medications with patient personally      Allergies: No Known Allergies    Social History:     Marital Status: Single     Social History     Substance and Sexual Activity   Alcohol Use Yes    Alcohol/week: 7 0 standard drinks    Types: 7 Glasses of wine per week    Frequency: 4 or more times a week     Social History     Tobacco Use   Smoking Status Former Smoker    Quit date:     Years since quittin 3   Smokeless Tobacco Never Used     Social History     Substance and Sexual Activity   Drug Use Never       Family History:    non-contributory    Physical Exam:     Vitals:   Blood Pressure: 124/58 (21 1331)  Pulse: 79 (21 1331)  Temperature: 98 5 °F (36 9 °C) (21)  Temp Source: Oral (21)  Respirations: (!) 24 (21 1331)  Weight - Scale: 88 3 kg (194 lb 10 7 oz) (05/05/21 0904)  SpO2: 94 % (05/05/21 1331)    Physical Exam  Constitutional:       Appearance: Normal appearance  Eyes:      Extraocular Movements: Extraocular movements intact  Pupils: Pupils are equal, round, and reactive to light  Neck:      Musculoskeletal: Neck supple  Cardiovascular:      Rate and Rhythm: Normal rate  Rhythm irregular  Pulmonary:      Effort: Pulmonary effort is normal       Breath sounds: Normal breath sounds  Abdominal:      General: Bowel sounds are normal       Palpations: Abdomen is soft  Musculoskeletal: Normal range of motion  Right lower leg: Edema present  Left lower leg: Edema present  Neurological:      Mental Status: She is alert and oriented to person, place, and time  Psychiatric:         Behavior: Behavior normal          Additional Data:     Lab Results: I have personally reviewed pertinent reports  Results from last 7 days   Lab Units 05/05/21  0901   WBC Thousand/uL 9 20   HEMOGLOBIN g/dL 16 0*   HEMATOCRIT % 48 5*   PLATELETS Thousands/uL 354   NEUTROS PCT % 65   LYMPHS PCT % 19   MONOS PCT % 7   EOS PCT % 8*     Results from last 7 days   Lab Units 05/05/21  0901   SODIUM mmol/L 139   POTASSIUM mmol/L 4 4   CHLORIDE mmol/L 110*   CO2 mmol/L 24   BUN mg/dL 17   CREATININE mg/dL 0 89   ANION GAP mmol/L 5   CALCIUM mg/dL 9 9   ALBUMIN g/dL 3 4*   TOTAL BILIRUBIN mg/dL 0 72   ALK PHOS U/L 483*   ALT U/L 168*   AST U/L 109*   GLUCOSE RANDOM mg/dL 115     Results from last 7 days   Lab Units 05/05/21  0901   INR  0 99             Results from last 7 days   Lab Units 05/05/21  1131   LACTIC ACID mmol/L 2 0       Imaging: I have personally reviewed pertinent reports        CT abdomen pelvis with contrast   Final Result by Sena Kline MD (05/05 1126)   Hepatic steatosis   No biliary dilation seen   No hypervascular lesion in the liver            Workstation performed: [id number]         XR chest 1 view portable   Final Result by Faisal Ponce MD (05/05 1232)      No acute cardiopulmonary disease  Workstation performed: [id number]             EKG, Pathology, and Other Studies Reviewed on Admission:   · EKG:  Ventricular rate 142  Could be SVT versus atrial flutter    Allscripts / Epic Records Reviewed: Yes     ** Please Note: This note has been constructed using a voice recognition system   ** No

## (undated) DEVICE — EYE PACK CUSTOM -FINNEGAN

## (undated) DEVICE — GLOVE SRG BIOGEL 7.5

## (undated) DEVICE — AIR INJECT CANNULA 27GA: Brand: OPHTHALMIC CANNULA

## (undated) DEVICE — THE MONARCH® "D" CARTRIDGE IS A SINGLE-USE POLYPROPYLENE CARTRIDGE FOR POSTERIOR CHAMBER IOL DELIVERY: Brand: MONARCH® III

## (undated) DEVICE — B-H IRRIGATING CAN 19GA FLAT ANGLED 8MM: Brand: OPHTHALMIC CANNULA

## (undated) DEVICE — ACTIVE FMS W/ INTREPID* ULTRA SLEEVES, 0.9MM 45° ABS* INTREPID* BALANCED TIP: Brand: ALCON

## (undated) DEVICE — INTREPID® TRANSFORMER IA HP: Brand: INTREPID®

## (undated) DEVICE — CLEARCUT® SLIT KNIFE INTREPID MICRO-COAXIAL SYSTEM 2.4 SB: Brand: CLEARCUT®; INTREPID

## (undated) DEVICE — MICROSURGICAL INSTRUMENT IRR. CYSTITOME 25GA STRAIGHT-REVERSE CUTTING: Brand: ALCON